# Patient Record
Sex: FEMALE | Race: WHITE | NOT HISPANIC OR LATINO | Employment: FULL TIME | URBAN - METROPOLITAN AREA
[De-identification: names, ages, dates, MRNs, and addresses within clinical notes are randomized per-mention and may not be internally consistent; named-entity substitution may affect disease eponyms.]

---

## 2022-05-24 ENCOUNTER — TELEPHONE (OUTPATIENT)
Dept: ORTHOPEDICS | Facility: CLINIC | Age: 47
End: 2022-05-24
Payer: COMMERCIAL

## 2022-05-24 NOTE — TELEPHONE ENCOUNTER
Contacted patient to give her a status update - rec'd images, have been sent to Dr. Martin for approval, and he's reviewed/approved her records.  Of note will need additional xrays on arrival to our location.  Discussed clearance work flow.  Has not spoken with PCP about f/u.  Ortho has declined f/u.  Pt will need labs, ekg, clearance.   She v/u of understanding of this and that surgery dates aren't discussed until full clearance is obtained. Pt has had recent dental exam.        I contacted Dr. Cheung's office to discuss program. Left v/m for someone to call me back. Will send request fax once I've spoken to someone.

## 2022-05-24 NOTE — TELEPHONE ENCOUNTER
Thank you   Please proceed    Of note, when she comes in for her surgery, she needs a new B knee standing PA flex view in addition to the normal templating xrays    Thank you  ===View-only below this line===  ----- Message -----  From: Josefina Yousif RN  Sent: 5/23/2022   9:37 PM CDT  To: Jose Martin III, MD, Kaleb Olmedo,   Ms. Azar was referred for L knee replacement.  Her BMI is 27.5 and has never smoked.  Her xrays are in.  PCP and ortho records are in media.  Please let me know of any requests.  Thank you,  Josefina

## 2022-05-25 ENCOUNTER — PATIENT MESSAGE (OUTPATIENT)
Dept: ORTHOPEDICS | Facility: CLINIC | Age: 47
End: 2022-05-25
Payer: COMMERCIAL

## 2022-05-31 ENCOUNTER — TELEPHONE (OUTPATIENT)
Dept: ORTHOPEDICS | Facility: CLINIC | Age: 47
End: 2022-05-31
Payer: COMMERCIAL

## 2022-05-31 NOTE — TELEPHONE ENCOUNTER
Called patient to do med review - patient at work; will be off rest of week.  Will call her tomorrow.

## 2022-06-01 ENCOUNTER — PATIENT MESSAGE (OUTPATIENT)
Dept: ORTHOPEDICS | Facility: CLINIC | Age: 47
End: 2022-06-01
Payer: COMMERCIAL

## 2022-06-01 ENCOUNTER — TELEPHONE (OUTPATIENT)
Dept: ORTHOPEDICS | Facility: CLINIC | Age: 47
End: 2022-06-01
Payer: COMMERCIAL

## 2022-06-01 RX ORDER — MELOXICAM 15 MG/1
15 TABLET ORAL DAILY
COMMUNITY
Start: 2022-01-14 | End: 2022-08-30 | Stop reason: HOSPADM

## 2022-06-01 RX ORDER — ESCITALOPRAM OXALATE 10 MG/1
10 TABLET ORAL DAILY
COMMUNITY
Start: 2022-03-14

## 2022-06-01 RX ORDER — TOPIRAMATE 100 MG/1
100 TABLET, FILM COATED ORAL NIGHTLY
COMMUNITY
Start: 2022-04-28

## 2022-06-01 RX ORDER — TRAMADOL HYDROCHLORIDE 50 MG/1
50 TABLET ORAL EVERY 6 HOURS PRN
COMMUNITY
Start: 2022-05-27 | End: 2022-08-30 | Stop reason: HOSPADM

## 2022-06-01 RX ORDER — AMLODIPINE BESYLATE AND BENAZEPRIL HYDROCHLORIDE 2.5; 1 MG/1; MG/1
1 CAPSULE ORAL DAILY
COMMUNITY
Start: 2022-05-24

## 2022-06-01 RX ORDER — METOPROLOL SUCCINATE 25 MG/1
25 TABLET, EXTENDED RELEASE ORAL NIGHTLY
COMMUNITY
Start: 2022-05-30

## 2022-06-01 RX ORDER — METHOCARBAMOL 750 MG/1
750 TABLET, FILM COATED ORAL EVERY 6 HOURS PRN
COMMUNITY
Start: 2022-05-27 | End: 2022-08-30 | Stop reason: HOSPADM

## 2022-06-01 NOTE — TELEPHONE ENCOUNTER
Planning to fly. Does not have any DME currently, we will order walker and bsc - has seat in shower. We did discuss in person vs virtual PT - will think about that and let me know.    Spoke to patient on: 6/1/2022    Surgeon: Tripp    Surgery: left Knee    Travel caregiver:     BMI:  27.5    Social Hx:     Updated in Epic               Patient works at [employer]            Plan to return home after surgery:  yes            Have support to help with care and appointments: yes     Allergies:    none    Medication list:    Updated in epic    Covid-19:   - vaccine: yes   - diagnosed with: yes; was not hospitalized, no O2 or inhalers, no residual effects     Self-description of overall health:  good    Health Hx:   Updated in [facility]    Surgical Hx:    Updated in epic    Can you take one flight of stairs: yes    RCRI:   - Coronary Artery Disease: no   - Congestive Heart Failure: no   - Cerebrovascular Disease: no   - Diabetes Mellitus on insulin: no      ROS:   - Fever/chills: no   - Infection: no   - Cough/Resp Issues:  no   - Bleeding (vaginal, rectal, vomiting, urination):  no   - GI/Fatty Liver/Acid Reflux/Vomiting: not currently; reports has hx of acid reflux and was on medication, but no longer having issues/taking meds   - UTI: no   - MRSA colonization: no   - Constipation:  no   - Recent steroid treatment: no   - Strokes or Passing out: no   - Blood Thinners or ASA: no   - Blood clot in heart/lung/extremity: no   - Stents: no   - Depression or Anxiety: yes - feels like this is well managed with current medication   - Problems with anesthesia:  no   - If female, having periods/pregnant: No  - Vision: wears glasses   - Dental exam recently: cleaning in Jan, sched for next cleaning in July - no issues currently    JAMES  Had sleep study done in past and was WDL   - S  snore loudly no   - T  tired during the day no   - O  stop breathing in sleep no   - P  BP/HTN yes   - B  BMI   27.5   - A  over 50 yes   - N   neck size   - G  male gender no    Family Hx:    Updated in Epic

## 2022-06-20 ENCOUNTER — DOCUMENTATION ONLY (OUTPATIENT)
Dept: INTERNAL MEDICINE | Facility: CLINIC | Age: 47
End: 2022-06-20
Payer: COMMERCIAL

## 2022-06-20 ENCOUNTER — TELEPHONE (OUTPATIENT)
Dept: ORTHOPEDICS | Facility: CLINIC | Age: 47
End: 2022-06-20
Payer: COMMERCIAL

## 2022-06-20 DIAGNOSIS — I10 HYPERTENSION, UNSPECIFIED TYPE: ICD-10-CM

## 2022-06-20 DIAGNOSIS — Z01.89 LABORATORY TEST: ICD-10-CM

## 2022-06-20 DIAGNOSIS — Z86.16 HISTORY OF COVID-19: ICD-10-CM

## 2022-06-20 PROBLEM — G43.909 MIGRAINE: Status: ACTIVE | Noted: 2022-06-20

## 2022-06-20 NOTE — PROGRESS NOTES
Chart reviewed in preparation for joint replacement     Health conditions of significance for the perioperative period     - HTN  - Migraine   - Depression

## 2022-06-20 NOTE — TELEPHONE ENCOUNTER
Attempted to reach patients with questions from Dr. Steiner - no answer; voicemail with call back number left for patient.

## 2022-06-21 ENCOUNTER — DOCUMENTATION ONLY (OUTPATIENT)
Dept: INTERNAL MEDICINE | Facility: CLINIC | Age: 47
End: 2022-06-21
Payer: COMMERCIAL

## 2022-06-21 ENCOUNTER — TELEPHONE (OUTPATIENT)
Dept: ORTHOPEDICS | Facility: CLINIC | Age: 47
End: 2022-06-21
Payer: COMMERCIAL

## 2022-06-21 DIAGNOSIS — Z86.16 HISTORY OF COVID-19: ICD-10-CM

## 2022-06-21 DIAGNOSIS — I10 HYPERTENSION, UNSPECIFIED TYPE: ICD-10-CM

## 2022-06-21 DIAGNOSIS — D64.9 ANEMIA, UNSPECIFIED TYPE: ICD-10-CM

## 2022-06-21 NOTE — PROGRESS NOTES
TB screenings were the standard employment screenings.  All negative.  Never symptomatic.           Arthroscopies   L knee in 2010 and 2020   R knee in 2018   No particular precipitating event.       She does still have her ovaries. Had an endometrial ablation as well.     Upon further discussion - she did not have the salpingectomy for reason such as ectopic or cancer, it was for contraception purposes.

## 2022-06-21 NOTE — TELEPHONE ENCOUNTER
Patient returned my call and we discussed Dr. Steiner's questions    TB screenings were the standard employment screenings.  All negative.  Never symptomatic.    Migraines are well controlled with topamax; states she gets migraine once a month maybe around the time of her cycle    HTN she feels is controlled.  Doesn't routinely check, but when she does she states it is in same range typically and mirrors what's at MD office.  120-130/70-80 - I did ask her to take a few over the rest of the week and check in Friday    No blood losses (bloody vomit, stool, etc)   States does take NSAID daily now (meloxicam) and after taking a few days in a row she will have issues with swelling and occasionally stomach upset, so she stops for a day or two and then resumes    Had Covid Halloween 2020    Arthroscopies   L knee in 2010 and 2020  R knee in 2018  No particular precipitating event.      She does still have her ovaries.  In our conversation she mentioned having had an endometrial ablation as well.    Upon further discussion - she did not have the salpingectomy for reason such as ectopic or cancer, it was for contraception purposes.

## 2022-06-22 ENCOUNTER — TELEPHONE (OUTPATIENT)
Dept: ORTHOPEDICS | Facility: CLINIC | Age: 47
End: 2022-06-22
Payer: COMMERCIAL

## 2022-06-22 NOTE — TELEPHONE ENCOUNTER
Attempted to reach patient about potential surgery dates - no answer; voicemail left with call back number

## 2022-06-23 ENCOUNTER — TELEPHONE (OUTPATIENT)
Dept: ORTHOPEDICS | Facility: CLINIC | Age: 47
End: 2022-06-23
Payer: COMMERCIAL

## 2022-06-24 ENCOUNTER — PATIENT MESSAGE (OUTPATIENT)
Dept: ORTHOPEDICS | Facility: CLINIC | Age: 47
End: 2022-06-24
Payer: COMMERCIAL

## 2022-06-30 ENCOUNTER — PATIENT MESSAGE (OUTPATIENT)
Dept: ORTHOPEDICS | Facility: CLINIC | Age: 47
End: 2022-06-30
Payer: COMMERCIAL

## 2022-06-30 ENCOUNTER — PATIENT MESSAGE (OUTPATIENT)
Dept: ADMINISTRATIVE | Facility: OTHER | Age: 47
End: 2022-06-30
Payer: COMMERCIAL

## 2022-06-30 DIAGNOSIS — M17.12 PRIMARY OSTEOARTHRITIS OF LEFT KNEE: Primary | ICD-10-CM

## 2022-07-18 ENCOUNTER — PATIENT MESSAGE (OUTPATIENT)
Dept: ORTHOPEDICS | Facility: CLINIC | Age: 47
End: 2022-07-18
Payer: COMMERCIAL

## 2022-07-18 ENCOUNTER — PATIENT MESSAGE (OUTPATIENT)
Dept: ADMINISTRATIVE | Facility: OTHER | Age: 47
End: 2022-07-18
Payer: COMMERCIAL

## 2022-08-01 ENCOUNTER — PATIENT MESSAGE (OUTPATIENT)
Dept: ADMINISTRATIVE | Facility: OTHER | Age: 47
End: 2022-08-01
Payer: COMMERCIAL

## 2022-08-05 ENCOUNTER — PATIENT MESSAGE (OUTPATIENT)
Dept: ADMINISTRATIVE | Facility: OTHER | Age: 47
End: 2022-08-05
Payer: COMMERCIAL

## 2022-08-08 ENCOUNTER — PATIENT MESSAGE (OUTPATIENT)
Dept: ADMINISTRATIVE | Facility: OTHER | Age: 47
End: 2022-08-08
Payer: COMMERCIAL

## 2022-08-09 ENCOUNTER — PATIENT MESSAGE (OUTPATIENT)
Dept: ORTHOPEDICS | Facility: CLINIC | Age: 47
End: 2022-08-09
Payer: COMMERCIAL

## 2022-08-24 ENCOUNTER — PATIENT MESSAGE (OUTPATIENT)
Dept: ADMINISTRATIVE | Facility: OTHER | Age: 47
End: 2022-08-24
Payer: COMMERCIAL

## 2022-08-29 ENCOUNTER — PATIENT MESSAGE (OUTPATIENT)
Dept: ADMINISTRATIVE | Facility: OTHER | Age: 47
End: 2022-08-29
Payer: COMMERCIAL

## 2022-08-30 ENCOUNTER — OFFICE VISIT (OUTPATIENT)
Dept: ORTHOPEDICS | Facility: CLINIC | Age: 47
End: 2022-08-30
Payer: COMMERCIAL

## 2022-08-30 ENCOUNTER — HOSPITAL ENCOUNTER (OUTPATIENT)
Dept: RADIOLOGY | Facility: HOSPITAL | Age: 47
Discharge: HOME | End: 2022-08-30
Attending: ORTHOPAEDIC SURGERY
Payer: COMMERCIAL

## 2022-08-30 ENCOUNTER — HOSPITAL ENCOUNTER (OUTPATIENT)
Dept: PREADMISSION TESTING | Facility: HOSPITAL | Age: 47
Discharge: HOME | End: 2022-08-30
Attending: ORTHOPAEDIC SURGERY
Payer: COMMERCIAL

## 2022-08-30 ENCOUNTER — ANESTHESIA EVENT (OUTPATIENT)
Dept: SURGERY | Facility: HOSPITAL | Age: 47
DRG: 470 | End: 2022-08-30
Payer: COMMERCIAL

## 2022-08-30 ENCOUNTER — OFFICE VISIT (OUTPATIENT)
Dept: INTERNAL MEDICINE | Facility: CLINIC | Age: 47
End: 2022-08-30
Payer: COMMERCIAL

## 2022-08-30 ENCOUNTER — TELEPHONE (OUTPATIENT)
Dept: ORTHOPEDICS | Facility: CLINIC | Age: 47
End: 2022-08-30

## 2022-08-30 ENCOUNTER — PATIENT MESSAGE (OUTPATIENT)
Dept: ORTHOPEDICS | Facility: CLINIC | Age: 47
End: 2022-08-30

## 2022-08-30 VITALS
BODY MASS INDEX: 27.16 KG/M2 | TEMPERATURE: 99 F | DIASTOLIC BLOOD PRESSURE: 79 MMHG | HEART RATE: 55 BPM | SYSTOLIC BLOOD PRESSURE: 130 MMHG | WEIGHT: 169 LBS | OXYGEN SATURATION: 100 % | HEIGHT: 66 IN | RESPIRATION RATE: 16 BRPM

## 2022-08-30 VITALS
HEIGHT: 66 IN | BODY MASS INDEX: 26.92 KG/M2 | WEIGHT: 167.5 LBS | HEIGHT: 66 IN | BODY MASS INDEX: 26.95 KG/M2 | WEIGHT: 167.69 LBS

## 2022-08-30 DIAGNOSIS — D64.9 ANEMIA, UNSPECIFIED TYPE: ICD-10-CM

## 2022-08-30 DIAGNOSIS — M17.12 PRIMARY OSTEOARTHRITIS OF LEFT KNEE: Primary | ICD-10-CM

## 2022-08-30 DIAGNOSIS — I10 PRIMARY HYPERTENSION: ICD-10-CM

## 2022-08-30 DIAGNOSIS — Z01.89 LABORATORY TEST: ICD-10-CM

## 2022-08-30 DIAGNOSIS — K21.9 GASTROESOPHAGEAL REFLUX DISEASE, UNSPECIFIED WHETHER ESOPHAGITIS PRESENT: ICD-10-CM

## 2022-08-30 DIAGNOSIS — F32.A DEPRESSION, UNSPECIFIED DEPRESSION TYPE: ICD-10-CM

## 2022-08-30 DIAGNOSIS — F11.90 OPIOID USE: ICD-10-CM

## 2022-08-30 DIAGNOSIS — M17.12 PRIMARY OSTEOARTHRITIS OF LEFT KNEE: ICD-10-CM

## 2022-08-30 DIAGNOSIS — Z01.818 PREOP EXAMINATION: Primary | ICD-10-CM

## 2022-08-30 DIAGNOSIS — G43.909 MIGRAINE WITHOUT STATUS MIGRAINOSUS, NOT INTRACTABLE, UNSPECIFIED MIGRAINE TYPE: ICD-10-CM

## 2022-08-30 DIAGNOSIS — Z86.16 HISTORY OF COVID-19: ICD-10-CM

## 2022-08-30 PROCEDURE — 99214 OFFICE O/P EST MOD 30 MIN: CPT | Mod: S$GLB,COE,, | Performed by: HOSPITALIST

## 2022-08-30 PROCEDURE — 99999 PR PBB SHADOW E&M-EST. PATIENT-LVL III: ICD-10-PCS | Mod: PBBFAC,COE,, | Performed by: NURSE PRACTITIONER

## 2022-08-30 PROCEDURE — 73560 X-RAY EXAM OF KNEE 1 OR 2: CPT | Mod: TC,50

## 2022-08-30 PROCEDURE — 73560 X-RAY EXAM OF KNEE 1 OR 2: CPT | Mod: 26,RT,COE, | Performed by: RADIOLOGY

## 2022-08-30 PROCEDURE — 99999 PR PBB SHADOW E&M-EST. PATIENT-LVL II: CPT | Mod: PBBFAC,COE,, | Performed by: HOSPITALIST

## 2022-08-30 PROCEDURE — 99999 PR PBB SHADOW E&M-EST. PATIENT-LVL III: CPT | Mod: PBBFAC,COE,, | Performed by: ORTHOPAEDIC SURGERY

## 2022-08-30 PROCEDURE — 73560 X-RAY EXAM OF KNEE 1 OR 2: CPT | Mod: 26,LT,COE, | Performed by: RADIOLOGY

## 2022-08-30 PROCEDURE — 99499 UNLISTED E&M SERVICE: CPT | Mod: S$GLB,COE,, | Performed by: NURSE PRACTITIONER

## 2022-08-30 PROCEDURE — 99204 OFFICE O/P NEW MOD 45 MIN: CPT | Mod: 57,S$GLB,COE, | Performed by: ORTHOPAEDIC SURGERY

## 2022-08-30 PROCEDURE — 73560 XR KNEE 1 OR 2 VIEW BILATERAL: ICD-10-PCS | Mod: 26,RT,COE, | Performed by: RADIOLOGY

## 2022-08-30 PROCEDURE — 99214 PR OFFICE/OUTPT VISIT, EST, LEVL IV, 30-39 MIN: ICD-10-PCS | Mod: S$GLB,COE,, | Performed by: HOSPITALIST

## 2022-08-30 PROCEDURE — 99999 PR PBB SHADOW E&M-EST. PATIENT-LVL III: CPT | Mod: PBBFAC,COE,, | Performed by: NURSE PRACTITIONER

## 2022-08-30 PROCEDURE — 99204 PR OFFICE/OUTPT VISIT, NEW, LEVL IV, 45-59 MIN: ICD-10-PCS | Mod: 57,S$GLB,COE, | Performed by: ORTHOPAEDIC SURGERY

## 2022-08-30 PROCEDURE — 73560 X-RAY EXAM OF KNEE 1 OR 2: CPT | Mod: TC,LT

## 2022-08-30 PROCEDURE — 99999 PR PBB SHADOW E&M-EST. PATIENT-LVL II: ICD-10-PCS | Mod: PBBFAC,COE,, | Performed by: HOSPITALIST

## 2022-08-30 PROCEDURE — 99499 NO LOS: ICD-10-PCS | Mod: S$GLB,COE,, | Performed by: NURSE PRACTITIONER

## 2022-08-30 PROCEDURE — 99999 PR PBB SHADOW E&M-EST. PATIENT-LVL III: ICD-10-PCS | Mod: PBBFAC,COE,, | Performed by: ORTHOPAEDIC SURGERY

## 2022-08-30 RX ORDER — SODIUM CHLORIDE 0.9 % (FLUSH) 0.9 %
10 SYRINGE (ML) INJECTION
Status: CANCELLED | OUTPATIENT
Start: 2022-08-30

## 2022-08-30 RX ORDER — POLYETHYLENE GLYCOL 3350 17 G/17G
17 POWDER, FOR SOLUTION ORAL DAILY
Status: CANCELLED | OUTPATIENT
Start: 2022-08-30

## 2022-08-30 RX ORDER — OXYCODONE HYDROCHLORIDE 5 MG/1
5 TABLET ORAL
Status: CANCELLED | OUTPATIENT
Start: 2022-08-30

## 2022-08-30 RX ORDER — DOCUSATE SODIUM 100 MG/1
100 CAPSULE, LIQUID FILLED ORAL 2 TIMES DAILY PRN
Qty: 60 CAPSULE | Refills: 0 | Status: SHIPPED | OUTPATIENT
Start: 2022-08-30

## 2022-08-30 RX ORDER — TALC
6 POWDER (GRAM) TOPICAL NIGHTLY PRN
Status: CANCELLED | OUTPATIENT
Start: 2022-08-30

## 2022-08-30 RX ORDER — PREGABALIN 75 MG/1
75 CAPSULE ORAL NIGHTLY
Status: CANCELLED | OUTPATIENT
Start: 2022-08-30

## 2022-08-30 RX ORDER — METHOCARBAMOL 750 MG/1
750 TABLET, FILM COATED ORAL 3 TIMES DAILY
Status: CANCELLED | OUTPATIENT
Start: 2022-08-30

## 2022-08-30 RX ORDER — FENTANYL CITRATE 50 UG/ML
25 INJECTION, SOLUTION INTRAMUSCULAR; INTRAVENOUS EVERY 5 MIN PRN
Status: CANCELLED | OUTPATIENT
Start: 2022-08-30

## 2022-08-30 RX ORDER — MIDAZOLAM HYDROCHLORIDE 1 MG/ML
4 INJECTION INTRAMUSCULAR; INTRAVENOUS
Status: CANCELLED | OUTPATIENT
Start: 2022-08-30 | End: 2022-08-31

## 2022-08-30 RX ORDER — DEXTROMETHORPHAN HYDROBROMIDE, GUAIFENESIN 5; 100 MG/5ML; MG/5ML
650 LIQUID ORAL
Qty: 120 TABLET | Refills: 0 | Status: SHIPPED | OUTPATIENT
Start: 2022-08-30

## 2022-08-30 RX ORDER — CELECOXIB 200 MG/1
200 CAPSULE ORAL DAILY
Status: CANCELLED | OUTPATIENT
Start: 2022-08-30

## 2022-08-30 RX ORDER — ACETAMINOPHEN 500 MG
1000 TABLET ORAL EVERY 6 HOURS
Status: CANCELLED | OUTPATIENT
Start: 2022-08-30

## 2022-08-30 RX ORDER — ASPIRIN 81 MG/1
81 TABLET ORAL 2 TIMES DAILY
Status: CANCELLED | OUTPATIENT
Start: 2022-08-30

## 2022-08-30 RX ORDER — CELECOXIB 200 MG/1
200 CAPSULE ORAL DAILY
Qty: 30 CAPSULE | Refills: 0 | Status: SHIPPED | OUTPATIENT
Start: 2022-08-30 | End: 2022-10-01

## 2022-08-30 RX ORDER — MUPIROCIN 20 MG/G
1 OINTMENT TOPICAL
Status: CANCELLED | OUTPATIENT
Start: 2022-08-30

## 2022-08-30 RX ORDER — METHOCARBAMOL 750 MG/1
750 TABLET, FILM COATED ORAL 3 TIMES DAILY PRN
Qty: 30 TABLET | Refills: 0 | Status: SHIPPED | OUTPATIENT
Start: 2022-08-30

## 2022-08-30 RX ORDER — CELECOXIB 200 MG/1
400 CAPSULE ORAL
Status: CANCELLED | OUTPATIENT
Start: 2022-08-30

## 2022-08-30 RX ORDER — PROCHLORPERAZINE EDISYLATE 5 MG/ML
5 INJECTION INTRAMUSCULAR; INTRAVENOUS EVERY 6 HOURS PRN
Status: CANCELLED | OUTPATIENT
Start: 2022-08-30

## 2022-08-30 RX ORDER — FENTANYL CITRATE 50 UG/ML
100 INJECTION, SOLUTION INTRAMUSCULAR; INTRAVENOUS
Status: CANCELLED | OUTPATIENT
Start: 2022-08-30 | End: 2022-08-31

## 2022-08-30 RX ORDER — CEFAZOLIN SODIUM 2 G/50ML
2 SOLUTION INTRAVENOUS
Status: CANCELLED | OUTPATIENT
Start: 2022-08-30 | End: 2022-08-31

## 2022-08-30 RX ORDER — CALC/MAG/B COMPLEX/D3/HERB 61
30 TABLET ORAL DAILY
COMMUNITY
Start: 2022-07-01

## 2022-08-30 RX ORDER — BISACODYL 10 MG
10 SUPPOSITORY, RECTAL RECTAL EVERY 12 HOURS PRN
Status: CANCELLED | OUTPATIENT
Start: 2022-08-30

## 2022-08-30 RX ORDER — MUPIROCIN 20 MG/G
1 OINTMENT TOPICAL 2 TIMES DAILY
Status: CANCELLED | OUTPATIENT
Start: 2022-08-30 | End: 2022-09-04

## 2022-08-30 RX ORDER — NALOXONE HCL 0.4 MG/ML
0.02 VIAL (ML) INJECTION
Status: CANCELLED | OUTPATIENT
Start: 2022-08-30

## 2022-08-30 RX ORDER — OXYCODONE HYDROCHLORIDE 5 MG/1
TABLET ORAL
Qty: 50 TABLET | Refills: 0 | Status: SHIPPED | OUTPATIENT
Start: 2022-08-30 | End: 2022-09-06 | Stop reason: SDUPTHER

## 2022-08-30 RX ORDER — ACETAMINOPHEN 500 MG
1000 TABLET ORAL
Status: CANCELLED | OUTPATIENT
Start: 2022-08-30

## 2022-08-30 RX ORDER — PREGABALIN 75 MG/1
75 CAPSULE ORAL
Status: CANCELLED | OUTPATIENT
Start: 2022-08-30

## 2022-08-30 RX ORDER — CEFAZOLIN SODIUM 2 G/50ML
2 SOLUTION INTRAVENOUS
Status: CANCELLED | OUTPATIENT
Start: 2022-08-30

## 2022-08-30 RX ORDER — MORPHINE SULFATE 2 MG/ML
2 INJECTION, SOLUTION INTRAMUSCULAR; INTRAVENOUS
Status: CANCELLED | OUTPATIENT
Start: 2022-08-30

## 2022-08-30 RX ORDER — SODIUM CHLORIDE 9 MG/ML
INJECTION, SOLUTION INTRAVENOUS
Status: CANCELLED | OUTPATIENT
Start: 2022-08-30

## 2022-08-30 RX ORDER — AMOXICILLIN 250 MG
1 CAPSULE ORAL 2 TIMES DAILY
Status: CANCELLED | OUTPATIENT
Start: 2022-08-30

## 2022-08-30 RX ORDER — FAMOTIDINE 20 MG/1
20 TABLET, FILM COATED ORAL 2 TIMES DAILY
Status: CANCELLED | OUTPATIENT
Start: 2022-08-30

## 2022-08-30 RX ORDER — ONDANSETRON 2 MG/ML
4 INJECTION INTRAMUSCULAR; INTRAVENOUS EVERY 8 HOURS PRN
Status: CANCELLED | OUTPATIENT
Start: 2022-08-30

## 2022-08-30 RX ORDER — ASPIRIN 81 MG/1
81 TABLET ORAL 2 TIMES DAILY
Qty: 60 TABLET | Refills: 0 | Status: SHIPPED | OUTPATIENT
Start: 2022-08-30 | End: 2022-10-01

## 2022-08-30 RX ORDER — SODIUM CHLORIDE 9 MG/ML
INJECTION, SOLUTION INTRAVENOUS CONTINUOUS
Status: CANCELLED | OUTPATIENT
Start: 2022-08-30 | End: 2022-08-31

## 2022-08-30 RX ORDER — OXYCODONE HYDROCHLORIDE 5 MG/1
10 TABLET ORAL
Status: CANCELLED | OUTPATIENT
Start: 2022-08-30

## 2022-08-30 RX ORDER — LIDOCAINE HYDROCHLORIDE 10 MG/ML
1 INJECTION, SOLUTION EPIDURAL; INFILTRATION; INTRACAUDAL; PERINEURAL
Status: CANCELLED | OUTPATIENT
Start: 2022-08-30

## 2022-08-30 NOTE — ASSESSMENT & PLAN NOTE
Migraines are well controlled with topamax; states she gets migraine once a month maybe around the time of her cycle        Had hormonal IUD in the past for contraception   1 st 2014   2 nd 2018   Had lot of pain    Had  heavy cycles after removal of IUD  She  had endometrial ablation done and had fallopian tubes removed in June 2019    LMP- June 2022  Not on contraception   Not pregnant per her  Sexually active      Her migraines have become less of a problem since she is going to the change   She has occasional hot flashes at nighttime which does not seem to be too troublesome for her

## 2022-08-30 NOTE — ASSESSMENT & PLAN NOTE
GERD Symptoms -acid taste to the throat    Does not sound Cardiac   Tips to control reflux discussed     GERD-  I suggest continuation of the Proton pump inhibitor in the perioperative period . I suggest aspiration precautions    Contributors     Caffeine   Fatty foods   Meloxicam     Lost 20 # since Feb 2022  By watching diet   Weight related conditions     Known to have     HTN  Hyperlipidemia   Acid reflux   Osteoarthritis    Not troubled with / Not known to have     Type 2 Diabetes    Sleep apnea    Fatty liver       Encouraged weight loss

## 2022-08-30 NOTE — ASSESSMENT & PLAN NOTE
Doing well   Lexapro is helping     I suggest monitoring the sodium as SIADH from Lexapro use and hypersecretion of ADH associated with surgery can reduce sodium in the perioperative period

## 2022-08-30 NOTE — HPI
History of present illness- I had the pleasure of meeting this pleasant 47 y.o. lady in the pre op clinic prior to her elective Orthopedic surgery. The patient is new to me . Teresa was accompanied by  Lei.    I have obtained the history by speaking to the patient and by reviewing the electronic health records.    Events leading up to surgery / History of presenting illness -      She has been troubled with moderate-severe  Left knee  pain for 1 year  .   Pain increases with activity and decreases with resting.  Taking Ultram once a day- 4-5 days  a week - Since march 2022  Meloxicam  - 4-5 days  a week - ( took it for since March 2022) -none since July 2022  Gets Stomach upset with Meloxicam   No ulcer history     Arthroscopic surgery for Meniscus injury    L knee in 2010 and 2020   R knee in 2018   No particular precipitating event.       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

## 2022-08-30 NOTE — PROGRESS NOTES
Subjective:     HPI:   Teresa Azar is a 47 y.o. female who presents for eval L knee OA, FITO L TKA    She complains of years of left knee pain global in nature predominantly lateral moderate to severe activity-related relieved with rest.  No groin anterior thigh radicular pain or paresthesias.    She had 1 right knee scope in 2018.  She has had 2 left knee scopes 1 in  and 1 in 2020.  The last knee scope documented grade 3 4 changes in her lateral compartment    She previously had a left total knee replacement scheduled locally in 2022    Medications: Tramadol daily (from her PCP) x2 years since L knee scope in , able to stop when got knee injections    Injections: 2022 left knee iaCSI, 90% relief for 8 weeks;    Physical Therapy: did PT post first knee scope, HEP with 2nd    Bracing: Yes,  HKB, the patient reported that she does not wear it because her leg would swell and the brace would be uncomfortable.     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 Stand 12.5 hrs at work daily    Social support: The patient stated that they live at home with their . The patient stated that their  would be able to help take care of them if they were to have surgery.        ROS:  The updated medical history is in the chart and has been reviewed. A review of systems is updated and there is no reported vision changes, ear/nose/mouth/throat complaints,  chest pain, shortness of breath, abdominal pain, urological complaints, fevers or chills, psychiatric complaints. Musculoskeletal and  neurologcial symptoms are as documented. All other systems are negative.      Objective:   Exam:  There were no vitals filed for this visit.  Body mass index is 27.48 kg/m².    Physical examination included assessment of the patient's general appearance with particular attention to development, nutrition, body habitus, attention to grooming, and any evidence of distress.  Constitutional: The patient is a well-developed, well-nourished patient in no acute distress.   Cardiovascular: Vascular examination included warmth and capillary refill as well inspection for edema and assessment of pedal pulses. Pulses are palpable and regular.  Musculoskeletal: Gait was assessed as to whether it was steady, non-antalgic, and/or required the use of an assist device. The patient was also asked to walk independently and get onto the examination table.  Skin: The skin was examined for any obvious rashes or lesions in the extremity.  Neurologic: Sensation is intact to light touch in the extremity. The patient has good coordination without hyperreflexia and is alert and oriented to person, place and time and has normal mood and affect.     All of the above were examined and found to be within normal limits except for the following pertinent clinical findings:    Slight limp slight antalgic gait favoring left knee.  0-125 degree knee range of motion 7° valgus alignment which does correct she is tender palpation mediolateral patellofemoral joint lines mild-to-moderate effusion knee stable anterior-posterior varus valgus stresses no flexion contracture or extensor lag      Imaging:    KNEE L ARTHRITIS     Indication:  Left knee pain  Exam Ordered: Radiographs of the left knee include a standing anteroposterior view, a standing posterioanterior view, a lateral view in full flexion, and a sunrise view  Details of Examination: Exam shows evidence of joint space narrowing, osteophyte formation, and subchondral sclerosis, all consistent with  degenerative arthritis of the knee.  No other significant findings are noted.  Impression:  Degenerative Arthritis, Left Knee    Grade 4 valgus left knee arthritis      Assessment:       ICD-10-CM ICD-9-CM   1. Primary osteoarthritis of left knee  M17.12 715.16      2 left knee scopes, 1 right knee scope  Long term tramadol use     Plan:       This patient has significant symptoms in their knee that are affecting their quality of life and daily activities.  They have tried non-operative treatment including analgesics, an exercise program, and activity modification, but the symptoms have persisted. I believe they make a good candidate for knee arthroplasty.     The risks and benefits of knee arthroplasty have been discussed with the patient which include, but are not limited to infections (including severe sequelae), potential component failure, fracture, DVT, pulmonary embolus, nerve palsy, poor range of motion, the possibility of bone grafting, persistent pain, wound healing complications, transfusions, medical complications and death.     We discussed surgical options including implant type and why I believe the implant selected is a good match for the patient's needs. The patient expressed understanding and agrees to proceed with the planned surgery.     Pre-operative planning will include the following:  A pre-surgical evaluation by will be arranged.  Pre-op orders will be placed.  We will make arrangements with the operating room for proper time and staffing.  We will make Social Service arrangements for the patient.    Implants:   Company: [employer]  System: Attune  primary tray    DVT prophylaxis: ASA 81mg BID x1 month    Dispo: outpatient PT    Admission status: Inpatient    Location: [place]        No orders of the defined types were placed in this encounter.            Past Medical History:   Diagnosis Date    Depression     Essential (primary) hypertension     GERD (gastroesophageal reflux disease)      Migraines     Primary osteoarthritis of left knee        Past Surgical History:   Procedure Laterality Date    ARTHROSCOPY OF BOTH KNEES      COLONOSCOPY  2019    ENDOMETRIAL ABLATION      SALPINGECTOMY Bilateral        Family History   Problem Relation Age of Onset    Cancer Mother 38        brain    Cancer Father         lung    Diabetes Sister     Heart attack Sister     Heart attacks under age 50 Brother        Social History     Socioeconomic History    Marital status:    Tobacco Use    Smoking status: Never    Smokeless tobacco: Never   Substance and Sexual Activity    Alcohol use: Yes     Comment: 1-2 drinks per month    Drug use: Never

## 2022-08-30 NOTE — ASSESSMENT & PLAN NOTE
She is on Lotrel in the morning time and Toprol-XL in the evening time   Home BP readings -120's/70's     Recent BP readings in the record-  Vitals - 1 value per visit 8/30/2022   SYSTOLIC 130   DIASTOLIC 79     Hypertension-  Blood pressure is acceptable . I suggest continuation of Toprol , Amlodipine - during the entire perioperative period. I suggest holding Benazepril- on the morning of the surgery and can continue that  post operatively under blood pressure, electrolyte and renal function monitoring as long as they are acceptable.I suggest addressing pain control as uncontrolled pain can increased blood pressure

## 2022-08-30 NOTE — ASSESSMENT & PLAN NOTE
She has occasional hemorrhoidal bleeding which is not heavy   She had that evaluated and is up-to-date with colonoscopy  She currently does not have any overt GI or  bleeding   She has been taking Meloxicam for her knee pain which is likely contributing to her anemia

## 2022-08-30 NOTE — ANESTHESIA PREPROCEDURE EVALUATION
[facility]  Anesthesia Pre-Operative Evaluation         Patient Name: Teresa Azar  YOB: 1975  MRN: [id number]    SUBJECTIVE:     Pre-operative evaluation for Procedure(s) (LRB):  ARTHROPLASTY, KNEE, TOTAL - Depuy-Attune - left - FITO (Left)     08/30/2022    Teresa Azar is a 47 y.o. female w/ a significant PMHx of HTN, Anemia, Migraines, Depression.    Patient now presents for the above procedure(s).      LDA: None documented.     Prev airway:  LMA 9/8/2020  Airway  Urgency: elective    General Information and Staff   Patient location during procedure: OR  Anesthesiologist: Tomi Tripathi MD  Performed: Anesthesiologist     Indications and Patient Condition  Indications for airway management: anesthesia  Sedation level: general induction  Preoxygenated: yes  Patient position: sniffing  Mask difficulty assessment: 1 - vent by mask    Final Airway Details  Final airway type: supraglottic airway    Successful airway: classic  Size 4    Number of attempts at approach: 1  Ventilation between attempts: 1 hand mask    Drips: None documented.      Patient Active Problem List   Diagnosis    HTN (hypertension)    Migraine    Laboratory test    History of COVID-19    Anemia       Review of patient's allergies indicates:  No Known Allergies    Current Inpatient Medications:      Current Outpatient Medications on File Prior to Encounter   Medication Sig Dispense Refill    amlodipine-benazepril 2.5-10 mg (LOTREL) 2.5-10 mg per capsule Take 1 capsule by mouth once daily.      EScitalopram oxalate (LEXAPRO) 10 MG tablet Take 10 mg by mouth once daily.      meloxicam (MOBIC) 15 MG tablet Take 15 mg by mouth once daily.      methocarbamoL (ROBAXIN) 750 MG Tab Take 750 mg by mouth every 6 (six) hours as needed.      metoprolol succinate (TOPROL-XL) 25 MG 24 hr tablet Take 25 mg by mouth once daily.      topiramate (TOPAMAX) 100 MG tablet Take 100 mg by mouth every evening.       "traMADoL (ULTRAM) 50 mg tablet Take 50 mg by mouth every 6 (six) hours as needed.       No current facility-administered medications on file prior to encounter.       Past Surgical History:   Procedure Laterality Date    ARTHROSCOPY OF BOTH KNEES      ENDOMETRIAL ABLATION      SALPINGECTOMY Bilateral        Social History     Socioeconomic History    Marital status:    Tobacco Use    Smoking status: Never    Smokeless tobacco: Never   Substance and Sexual Activity    Alcohol use: Yes     Comment: "rarely" - one drink per month    Drug use: Never       OBJECTIVE:     Vital Signs Range (Last 24H):  Temp:  [37 °C (98.6 °F)]   Pulse:  [55]   Resp:  [16]   BP: (130)/(79)   SpO2:  [100 %]       Significant Labs:  No results found for: WBC, HGB, HCT, PLT, CHOL, TRIG, HDL, LDLDIRECT, ALT, AST, NA, K, CL, CREATININE, BUN, CO2, TSH, PSA, INR, GLUF, HGBA1C, MICROALBUR    Diagnostic Studies: No relevant studies.    EKG:   No results found for this or any previous visit.    2D ECHO:  TTE:  No results found for this or any previous visit.    JANICE:  No results found for this or any previous visit.    ASSESSMENT/PLAN:                                                                                                                  08/30/2022  Teresa Azar is a 47 y.o., female.      Pre-op Assessment    I have reviewed the Patient Summary Reports.     I have reviewed the Nursing Notes. I have reviewed the NPO Status.   I have reviewed the Medications.     Review of Systems  Anesthesia Hx:  No problems with previous Anesthesia Denies Hx of Anesthetic complications  History of prior surgery of interest to airway management or planning: Denies Family Hx of Anesthesia complications.   Denies Personal Hx of Anesthesia complications.   Hematology/Oncology:         -- Anemia:   Cardiovascular:   Exercise tolerance: good Hypertension Denies CAD.    Denies CABG/stent.   Denies CHF. no hyperlipidemia    Pulmonary:   Denies " COPD.  Denies Asthma.  Denies Sleep Apnea.    Renal/:   Denies Chronic Renal Disease.     Hepatic/GI:   Denies GERD.    Musculoskeletal:   Arthritis     Neurological:   Denies CVA. Headaches Denies Seizures.    Endocrine:   Denies Diabetes.  Denies Obesity / BMI > 30  Psych:   Denies Psychiatric History. depression          Physical Exam  General: Well nourished, Cooperative, Alert and Oriented    Airway:  Mallampati: I   Mouth Opening: Normal  TM Distance: Normal  Tongue: Normal  Neck ROM: Normal ROM    Dental:  Intact        Anesthesia Plan  Type of Anesthesia, risks & benefits discussed:    Anesthesia Type: Regional, Gen Supraglottic Airway, Gen Natural Airway, MAC  Intra-op Monitoring Plan: Standard ASA Monitors  Post Op Pain Control Plan: multimodal analgesia and IV/PO Opioids PRN  Induction:  IV  Airway Plan: Direct and Video, Post-Induction  Informed Consent: Informed consent signed with the Patient and all parties understand the risks and agree with anesthesia plan.  All questions answered. Patient consented to blood products? Yes  ASA Score: 2  Day of Surgery Review of History & Physical: H&P Update referred to the surgeon/provider.    Ready For Surgery From Anesthesia Perspective.     .

## 2022-08-30 NOTE — ASSESSMENT & PLAN NOTE
Had Covid Halloween 2020   Had cough, loss of taste , smell   Generalized weakness, fatigue  Was not hospitalized, no O2 or inhalers, no residual effects   Breathing good   Had 3 doses of COVID vaccine

## 2022-08-30 NOTE — H&P (VIEW-ONLY)
CC:  Left knee pain    Teresa Azar is a 47 y.o. female with history of Left knee pain. Pain is worse with activity and weight bearing.  Patient has experienced interference of activities of daily living due to decreased range of motion and an increase in joint pain and swelling.  Patient has failed non-operative treatment including NSAIDs, corticosteroid injections, viscosupplement injections, and activity modification.  Teresa Azar currently ambulates independently.     Relevant medical conditions of significance in perioperative period:  HTN- on medication managed by pcp  Depression- on medication managed by ppc     Given documented medical comorbidities including those listed above and based off of CMS criteria patient would meet inpatient admission status guidelines. This case has been approved as inpatient.     Past Medical History:   Diagnosis Date    Depression     Essential (primary) hypertension     Migraines     Primary osteoarthritis of left knee        Past Surgical History:   Procedure Laterality Date    ARTHROSCOPY OF BOTH KNEES      ENDOMETRIAL ABLATION      SALPINGECTOMY Bilateral        Family History   Problem Relation Age of Onset    Cancer Mother 38        brain    Cancer Father         lung    Diabetes Sister     Heart attack Sister     Heart attacks under age 50 Brother        Review of patient's allergies indicates:  No Known Allergies      Current Outpatient Medications:     amlodipine-benazepril 2.5-10 mg (LOTREL) 2.5-10 mg per capsule, Take 1 capsule by mouth once daily., Disp: , Rfl:     EScitalopram oxalate (LEXAPRO) 10 MG tablet, Take 10 mg by mouth once daily., Disp: , Rfl:     metoprolol succinate (TOPROL-XL) 25 MG 24 hr tablet, Take 25 mg by mouth once daily., Disp: , Rfl:     topiramate (TOPAMAX) 100 MG tablet, Take 100 mg by mouth every evening., Disp: , Rfl:     traMADoL (ULTRAM) 50 mg tablet, Take 50 mg by mouth every 6 (six) hours as needed., Disp: , Rfl:     meloxicam  "(MOBIC) 15 MG tablet, Take 15 mg by mouth once daily., Disp: , Rfl:     methocarbamoL (ROBAXIN) 750 MG Tab, Take 750 mg by mouth every 6 (six) hours as needed., Disp: , Rfl:     Review of Systems:  Constitutional: no fever or chills  Eyes: no visual changes  ENT: no nasal congestion or sore throat  Respiratory: no cough or shortness of breath  Cardiovascular: no chest pain or palpitations  Gastrointestinal: no nausea or vomiting, tolerating diet  Genitourinary: no hematuria or dysuria  Integument/Breast: no rash or pruritis  Hematologic/Lymphatic: no easy bruising or lymphadenopathy  Musculoskeletal: positive for knee pain  Neurological: no seizures or tremors  Behavioral/Psych: no auditory or visual hallucinations  Endocrine: no heat or cold intolerance    PE:  Ht 5' 5.5" (1.664 m)   Wt 76 kg (167 lb 8 oz)   BMI 27.45 kg/m²   General: Pleasant, cooperative, NAD   Gait: antalgic  HEENT: NCAT, sclera nonicteric   Lungs: Respirations clear bilaterally; equal and unlabored.   CV: S1S2; 2+ bilateral upper and lower extremity pulses.   Skin: Intact throughout with no rashes, erythema, or lesions  Extremities: No LE edema,  no erythema or warmth of the skin in either lower extremity.    Left knee exam:  Knee Range of Motion:normal, pain with passive range of motion  Effusion:none  Condition of skin:intact  Location of tenderness:Medial joint line   Strength:normal  Stability: stable to testing    Hip Examination: painless PROM of hip     Radiographs: Radiographs reveal advanced degenerative changes including subchondral cyst formation, subchondral sclerosis, osteophyte formation, joint space narrowing.     Knee Alignment: normal    Diagnosis: Primary osteoarthritis Left knee    Plan: Left total knee arthroplasty    Due to the serious nature of total joint infection and the high prevalence of community acquired MRSA, vancomycin will be used perioperatively.            "

## 2022-08-30 NOTE — OUTPATIENT SUBJECTIVE & OBJECTIVE
Outpatient Subjective & Objective     Chief complaint-Preoperative evaluation, Perioperative Medical management, complication reduction plan     Active cardiac conditions- none    Revised cardiac risk index predictors- none    Functional capacity -Examples of physical activity  , house work and can take a flight of stairs holding on to the railing----- She can undertake all the above activities without  chest pain,chest tightness, Shortness of breath ,dizziness,lightheadedness making her exercise tolerance more,   than 4 Mets.       Review of Systems   Constitutional:  Negative for chills and fever.   HENT:          STOPBANG score  2/ 8        Elevated BP  Age over 50        Eyes:         No new visual changes   Respiratory:          No cough , phlegm    No Hemoptysis   Cardiovascular:         As noted   Gastrointestinal:           Bowel movements- Regular /daily   Endocrine:        Prednisone use > 20 mg daily for 3 weeks- None   Genitourinary:  Negative for dysuria.        No urinary hesitancy    Musculoskeletal:         As above      Skin:  Negative for rash.   Neurological:  Negative for syncope.        No unilateral weakness   Hematological:         Current use of Anticoagulants  None   Psychiatric/Behavioral:            Depression, - Controlled     No SI/HI   No vascular stenting     Not known to have heart disease , Diabetes Mellitus, Lung disease      Past Medical History Pertinent Negatives:   Diagnosis Date Noted    Deep vein thrombosis 08/30/2022    Pulmonary embolism 08/30/2022           No anesthesia, bleeding, cardiac problems , PONV with previous surgeries/procedures.  Medications and Allergies reviewed in [facility].     Had Cardiology evaluation long time ago   Not known to have heart disease   No longer has palpitations since 2012    Physical Exam    Vitals - 1 value per visit 8/30/2022   SYSTOLIC 130   DIASTOLIC 79   Pulse 55   Temp 98.6   Resp 16   SPO2 100   Weight (lb) 169   Weight (kg) 76.658    Height 65.5   BMI (Calculated) 27.7   No dizziness       Physical Exam  Constitutional- Vitals - Eyes- No conjunctival icterus,pupils  round  and reactive to light   ENT-Oral cavity-moist    , Hearing grossly normal   Neck- No thyromegaly ,Trachea -central, No jugular venous distension,   No Carotid Bruit   Cardiovascular -Heart Sounds- Normal   , No gallop rhythm   Respiratory - Normal Respiratory Effort,  no wheeze , and  no forced expiratory wheeze    Peripheral pitting pedal edema-- none , no calf pain   Gastrointestinal -Soft abdomen, No palpable masses, Non Tender,Liver,Spleen not palpable. No-- free fluid and shifting dullness  Musculoskeletal- No finger Clubbing. Strength grossly normal   Lymphatic-No Palpable cervical, axillary,Inguinal lymphadenopathy   Psychiatric - normal effect,Orientation  Rt Dorsalis pedis pulses-palpable    Lt Dorsalis pedis pulses- palpable   Rt Posterior tibial pulses -palpable   Left posterior tibial pulses -palpable   Miscellaneous -  Surgical scarboth knees   and  no renal bruit   I offered a gown and the presence of a chaperone during physical examination   She was comfortable to proceed with the exam without the the presence of a chaperone    Investigations  Labs  have been reviewed in epic.      Review of old records- Was done and information gathered regards to events leading to surgery and health conditions of significance in the perioperative period.    Outpatient Subjective & Objective

## 2022-08-30 NOTE — PROGRESS NOTES
[facility] [address]  Progress Note    Patient Name: Teresa Azar  MRN: [id number]  Date of Evaluation- 08/30/2022  PCP- Sarthak Cheung MD    Future cases for Teresa Azar [[id number]]       Case ID Status Date Time [place] Procedure Provider Location    [id number] [facility] 8/31/2022  8:00  ARTHROPLASTY, KNEE, TOTAL - Depuy-Attune - left - FITO Jose Martin III, MD [[id number]] [facility]            HPI:  History of present illness- I had the pleasure of meeting this pleasant 47 y.o. lady in the pre op clinic prior to her elective Orthopedic surgery. The patient is new to me . Teresa was accompanied by  Lei.    I have obtained the history by speaking to the patient and by reviewing the electronic health records.    Events leading up to surgery / History of presenting illness -      She has been troubled with moderate-severe  Left knee  pain for 1 year  .   Pain increases with activity and decreases with resting.  Taking Ultram once a day- 4-5 days  a week - Since march 2022  Meloxicam  - 4-5 days  a week - ( took it for since March 2022) -none since July 2022  Gets Stomach upset with Meloxicam   No ulcer history     Arthroscopic surgery for Meniscus injury    L knee in 2010 and 2020   R knee in 2018   No particular precipitating event.       Relevant health conditions of significance for the perioperative period/ History of presenting illness -    Subjectively describes health as good     Health conditions of significance for the perioperative period      - HTN  - Acid reflux   - Anemia  - Migraine   - History of COVID Oct 2020         She works as a pharmacist for [employer]   She lives with her  and 3 of her children   Her  had a heart transplant   They live in a 2 level house and their bedroom is downstairs     She has help available after surgery           Subjective/ Objective:       Chief complaint-Preoperative evaluation, Perioperative Medical management, complication reduction plan      Active cardiac conditions- none    Revised cardiac risk index predictors- none    Functional capacity -Examples of physical activity  , house work and can take a flight of stairs holding on to the railing----- She can undertake all the above activities without  chest pain,chest tightness, Shortness of breath ,dizziness,lightheadedness making her exercise tolerance more,   than 4 Mets.       Review of Systems   Constitutional:  Negative for chills and fever.   HENT:          STOPBANG score  2/ 8        Elevated BP  Age over 50        Eyes:         No new visual changes   Respiratory:          No cough , phlegm    No Hemoptysis   Cardiovascular:         As noted   Gastrointestinal:           Bowel movements- Regular /daily   Endocrine:        Prednisone use > 20 mg daily for 3 weeks- None   Genitourinary:  Negative for dysuria.        No urinary hesitancy    Musculoskeletal:         As above      Skin:  Negative for rash.   Neurological:  Negative for syncope.        No unilateral weakness   Hematological:         Current use of Anticoagulants  None   Psychiatric/Behavioral:            Depression, - Controlled     No SI/HI   No vascular stenting     Not known to have heart disease , Diabetes Mellitus, Lung disease      Past Medical History Pertinent Negatives:   Diagnosis Date Noted    Deep vein thrombosis 08/30/2022    Pulmonary embolism 08/30/2022           No anesthesia, bleeding, cardiac problems , PONV with previous surgeries/procedures.  Medications and Allergies reviewed in epic.     Had Cardiology evaluation long time ago   Not known to have heart disease   No longer has palpitations since 2012    Physical Exam    Vitals - 1 value per visit 8/30/2022   SYSTOLIC 130   DIASTOLIC 79   Pulse 55   Temp 98.6   Resp 16   SPO2 100   Weight (lb) 169   Weight (kg) 76.658   Height 65.5   BMI (Calculated) 27.7   No dizziness       Physical Exam  Constitutional- Vitals - Eyes- No conjunctival icterus,pupils  round  and  reactive to light   ENT-Oral cavity-moist    , Hearing grossly normal   Neck- No thyromegaly ,Trachea -central, No jugular venous distension,   No Carotid Bruit   Cardiovascular -Heart Sounds- Normal   , No gallop rhythm   Respiratory - Normal Respiratory Effort,  no wheeze , and  no forced expiratory wheeze    Peripheral pitting pedal edema-- none , no calf pain   Gastrointestinal -Soft abdomen, No palpable masses, Non Tender,Liver,Spleen not palpable. No-- free fluid and shifting dullness  Musculoskeletal- No finger Clubbing. Strength grossly normal   Lymphatic-No Palpable cervical, axillary,Inguinal lymphadenopathy   Psychiatric - normal effect,Orientation  Rt Dorsalis pedis pulses-palpable    Lt Dorsalis pedis pulses- palpable   Rt Posterior tibial pulses -palpable   Left posterior tibial pulses -palpable   Miscellaneous -  Surgical scarboth knees   and  no renal bruit   I offered a gown and the presence of a chaperone during physical examination   She was comfortable to proceed with the exam without the the presence of a chaperone    Investigations  Labs  have been reviewed in epic.      Review of old records- Was done and information gathered regards to events leading to surgery and health conditions of significance in the perioperative period.      Preoperative cardiac risk assessment-  The patient does not have any active cardiac conditions . Revised cardiac risk index predictors- 0---.Functional capacity is more than 4 Mets. She will be undergoing a Orthopedic procedure that carries a Moderate Risk risk     Risk of a major Cardiac event ( Defined as death, myocardial infarction, or cardiac arrest at 30 days after noncardiac surgery), based on RCRI score     -3.9%       No further cardiac work up is indicated prior to proceeding with the surgery          American Society of Anesthesiologists Physical status classification ( ASA ) class: 2     Postoperative pulmonary complication risk assessment:      MABEL  ( Canet) risk index- risk class -  Low       Assessment/Plan:     Migraine  Migraines are well controlled with topamax; states she gets migraine once a month maybe around the time of her cycle        Had hormonal IUD in the past for contraception   1 st 2014 2 nd 2018   Had lot of pain    Had  heavy cycles after removal of IUD  She  had endometrial ablation done and had fallopian tubes removed in June 2019    LMP- June 2022  Not on contraception   Not pregnant per her  Sexually active      Her migraines have become less of a problem since she is going to the change   She has occasional hot flashes at nighttime which does not seem to be too troublesome for her             HTN (hypertension)  She is on Lotrel in the morning time and Toprol-XL in the evening time   Home BP readings -120's/70's     Recent BP readings in the record-  Vitals - 1 value per visit 8/30/2022   SYSTOLIC 130   DIASTOLIC 79     Hypertension-  Blood pressure is acceptable . I suggest continuation of Toprol , Amlodipine - during the entire perioperative period. I suggest holding Benazepril- on the morning of the surgery and can continue that  post operatively under blood pressure, electrolyte and renal function monitoring as long as they are acceptable.I suggest addressing pain control as uncontrolled pain can increased blood pressure     History of COVID-19  Had Covid Halloween 2020   Had cough, loss of taste , smell   Generalized weakness, fatigue  Was not hospitalized, no O2 or inhalers, no residual effects   Breathing good   Had 3 doses of COVID vaccine         Laboratory test                        EKG May 2022  No acute appearing changes       Anemia  She has occasional hemorrhoidal bleeding which is not heavy   She had that evaluated and is up-to-date with colonoscopy  She currently does not have any overt GI or  bleeding   She has been taking Meloxicam for her knee pain which is likely contributing to her anemia       Acid reflux  GERD  Symptoms -acid taste to the throat    Does not sound Cardiac   Tips to control reflux discussed     GERD-  I suggest continuation of the Proton pump inhibitor in the perioperative period . I suggest aspiration precautions    Contributors     Caffeine   Fatty foods   Meloxicam     Lost 20 # since Feb 2022  By watching diet   Weight related conditions     Known to have     HTN  Hyperlipidemia   Acid reflux   Osteoarthritis    Not troubled with / Not known to have     Type 2 Diabetes    Sleep apnea    Fatty liver       Encouraged weight loss    Opioid use  Tramadol use  More frequency past 5 months   Chronic continuous opioid use- In view of the opioid use, the patient may have opioid tolerance . I suggest considering the possibility of opioid tolerance  in planning post operative pain control     Discussed possibly reducing the opioid use in preparation for surgery , so that it reduces the opioid tolerance which helps post op pain control     No opioid withdrawl    Depression  Doing well   Lexapro is helping     I suggest monitoring the sodium as SIADH from Lexapro use and hypersecretion of ADH associated with surgery can reduce sodium in the perioperative period        Preventive perioperative care    Thromboembolic prophylaxis:  Her risk factors for thrombosis include surgical procedure and age.I suggest  thromboembolic prophylaxis ( mechanical/pharmacological, weighing the risk benefits of pharmacological agent use considering roberto carlos procedural bleeding )  during the perioperative period.I suggested being active in the post operative period.   The patient is a candidate for extended DVT prophylaxis      Postoperative pulmonary complication prophylaxis-- I suggest incentive spirometry use, early ambulation, and end tidal carbon dioxide monitoring  , oral care , head end of bed elevation      Renal complication prophylaxis-Risk factors for renal complications include hypertension . I suggest keeping her well hydrated   in the perioperative period.     Surgical site Infection Prophylaxis-I  suggest appropriate antibiotic for Prophylaxis against Surgical site infections  No reported Staph infection  Skin antibacterial discussed       Delirium prophylaxis-Risk factors - opioid use - I suggest avoidance / minimizing the use of  Benzodiazepines ( unless the patient has been taking it on a regular basis ),Anticholinergic medication,Antihistamines ( like  Benadryl).I suggest minimizing the use of opioid medication and use of IV tylenol,if it is appropriate. I suggest using the lowest possible dose of opioids for the shortest duration possible in the perioperative period. I suggest to Keep shades/blinds open during the day, lights off and shades closed at night to encourage normal sleep/wake cycle.I encourage the presence of the family member with the patient at all times, if at all possible as mental status changes can be picked up early by the family members and they help with reorientation. I encouraged the presence of family to help with orientation in the perioperative period. Benadryl avoidance suggested      In view of Regional anesthesia the patient  is at risk of postoperative urinary retention.  I suggest avoidance / minimizing the of  Benzodiazepines,Anticholinergic medication,antihistamines ( Benadryl) , if possible in the perioperative period. I suggest using the minimum possible use of opioids for the minimum period of time in the perioperative period. Benadryl avoidance suggested      This visit was focused on Preoperative evaluation, Perioperative Medical management, complication reduction plans. I suggest that the patient follows up with primary care or relevant sub specialists for ongoing health care.    I appreciate the opportunity to be involved in this patients care. Please feel free to contact me if there were any questions about this consultation.    Patient is optimized    Patient/ care giver/ Family member was  instructed to call and update me about any changes to health,  medication, office visits ,testing out side of the [facility] , hospitalizations between now and surgery      Tamiko Steiner MD  Internal Medicine  [facility]   Cell Phone- [phone]        COVID screening     No fever   No cough   No SOB  No sore throat   No loss of taste or smell   No muscle aches   No nausea, vomiting , diarrhea -  --  \8/30/2022- 17 12    Reports having had an Rt Axillary lymph node enlargement after COVID vaccine   Spoke to her and her   Jan 2021   No infection   Resolved after 2 weeks   No problem with the 2 subsequent vaccines   No questions     Called to follow up , spoke to her to address any concerns with the up coming surgery or any questions on Medication instructions -  Doing well ,No changes to Medication, Health -

## 2022-08-30 NOTE — PROGRESS NOTES
Teresa Azar is a 47 y.o. year old here today for preoperative visit in preparation for a Left total knee arthroplasty to be performed by Dr. Martin  on 8/31/2022.  she was last seen and treated in the clinic on 8/30/2022. she will be medically optimized by the pre op center. There has been no significant change in medical status since last visit. No fever, chills, malaise, or unexplained weight change.      Allergies, Medications, past medical and surgical history reviewed.    Focused examination performed.    Patient saw surgeon in clinic today. All questions answered. Patient encouraged to call with questions. Contact information given.     Pre, [facility]to carlos, and post operative procedures and expectations discussed. Goals of successful surgery reviewed and include manageable pain levels, surgical site free of infection, medication management, and ambulation with PT/OT assistance. Healthy weight management discussed with patient and caregiver who were receptive to eduction of healthy diet and activity. No other necessary lifestyle changes identified. Educated patient about signs and symptoms of infection, medication management, anticoagulation therapy, risk of tobacco and alcohol use, and self-care to promote healing. Surgical guide given for future reference. Hibiclens given to patient with instructions. All questions were answered.     Teresa Azar verbalized an understanding to the education and goals. Patient has displayed readiness to engage in care and is ready to proceed with surgery.  Patient reports her  is able and ready to provide assistance at home after discharge.    Surgical and blood consents signed.    Teresa Azar will contact us if there are any questions, concerns, or changes in medical status prior to surgery.       Joint class: zoom    COVID-19 test date: vaccinated     Patient has discussed discharge planning with surgeon. Patient will be discharged to home following surgery.   patient  will be scheduled with [facility].    30 minutes of time was spent on patient education, review of records, templating, H&P, , appointment scheduling and optimizing patient for surgery.

## 2022-08-30 NOTE — ASSESSMENT & PLAN NOTE
Tramadol use  More frequency past 5 months   Chronic continuous opioid use- In view of the opioid use, the patient may have opioid tolerance . I suggest considering the possibility of opioid tolerance  in planning post operative pain control     Discussed possibly reducing the opioid use in preparation for surgery , so that it reduces the opioid tolerance which helps post op pain control     No opioid withdrawl

## 2022-08-30 NOTE — TELEPHONE ENCOUNTER
Met with patient during visits in our dept.  Reviewed further pre/post op questions she had, answered questions about [facility], and workflow for her stay. Ample time allowed for q&a and v/u to call for any needs.

## 2022-08-31 ENCOUNTER — HOSPITAL ENCOUNTER (INPATIENT)
Facility: HOSPITAL | Age: 47
LOS: 1 days | Discharge: HOME | DRG: 470 | End: 2022-09-01
Attending: ORTHOPAEDIC SURGERY | Admitting: ORTHOPAEDIC SURGERY
Payer: COMMERCIAL

## 2022-08-31 ENCOUNTER — ANESTHESIA (OUTPATIENT)
Dept: SURGERY | Facility: HOSPITAL | Age: 47
DRG: 470 | End: 2022-08-31
Payer: COMMERCIAL

## 2022-08-31 ENCOUNTER — TELEPHONE (OUTPATIENT)
Dept: PHARMACY | Facility: CLINIC | Age: 47
End: 2022-08-31
Payer: COMMERCIAL

## 2022-08-31 DIAGNOSIS — M17.12 PRIMARY OSTEOARTHRITIS OF LEFT KNEE: ICD-10-CM

## 2022-08-31 PROCEDURE — 25000003 PHARM REV CODE 250: Performed by: SURGERY

## 2022-08-31 PROCEDURE — 97116 GAIT TRAINING THERAPY: CPT

## 2022-08-31 PROCEDURE — 97535 SELF CARE MNGMENT TRAINING: CPT

## 2022-08-31 PROCEDURE — 97161 PT EVAL LOW COMPLEX 20 MIN: CPT

## 2022-08-31 PROCEDURE — 71000033 HC RECOVERY, INTIAL HOUR: Performed by: ORTHOPAEDIC SURGERY

## 2022-08-31 PROCEDURE — 37000008 HC ANESTHESIA 1ST 15 MINUTES: Performed by: ORTHOPAEDIC SURGERY

## 2022-08-31 PROCEDURE — 97165 OT EVAL LOW COMPLEX 30 MIN: CPT

## 2022-08-31 PROCEDURE — 71000039 HC RECOVERY, EACH ADD'L HOUR: Performed by: ORTHOPAEDIC SURGERY

## 2022-08-31 PROCEDURE — D9220A PRA ANESTHESIA: ICD-10-PCS | Mod: CRNA,COE,, | Performed by: NURSE ANESTHETIST, CERTIFIED REGISTERED

## 2022-08-31 PROCEDURE — 11000001 HC ACUTE MED/SURG PRIVATE ROOM

## 2022-08-31 PROCEDURE — 63600175 PHARM REV CODE 636 W HCPCS: Performed by: ORTHOPAEDIC SURGERY

## 2022-08-31 PROCEDURE — C1713 ANCHOR/SCREW BN/BN,TIS/BN: HCPCS | Performed by: ORTHOPAEDIC SURGERY

## 2022-08-31 PROCEDURE — 63600175 PHARM REV CODE 636 W HCPCS: Performed by: SURGERY

## 2022-08-31 PROCEDURE — 27201423 OPTIME MED/SURG SUP & DEVICES STERILE SUPPLY: Performed by: ORTHOPAEDIC SURGERY

## 2022-08-31 PROCEDURE — 27447 TOTAL KNEE ARTHROPLASTY: CPT | Mod: LT,COE,, | Performed by: ORTHOPAEDIC SURGERY

## 2022-08-31 PROCEDURE — 27100025 HC TUBING, SET FLUID WARMER: Performed by: SURGERY

## 2022-08-31 PROCEDURE — 25000003 PHARM REV CODE 250: Performed by: NURSE PRACTITIONER

## 2022-08-31 PROCEDURE — 36000710: Performed by: ORTHOPAEDIC SURGERY

## 2022-08-31 PROCEDURE — 25000003 PHARM REV CODE 250: Performed by: NURSE ANESTHETIST, CERTIFIED REGISTERED

## 2022-08-31 PROCEDURE — C1776 JOINT DEVICE (IMPLANTABLE): HCPCS | Performed by: ORTHOPAEDIC SURGERY

## 2022-08-31 PROCEDURE — D9220A PRA ANESTHESIA: Mod: ANES,COE,, | Performed by: SURGERY

## 2022-08-31 PROCEDURE — 99900035 HC TECH TIME PER 15 MIN (STAT)

## 2022-08-31 PROCEDURE — D9220A PRA ANESTHESIA: Mod: CRNA,COE,, | Performed by: NURSE ANESTHETIST, CERTIFIED REGISTERED

## 2022-08-31 PROCEDURE — 25000003 PHARM REV CODE 250: Performed by: ORTHOPAEDIC SURGERY

## 2022-08-31 PROCEDURE — 27447 PR TOTAL KNEE ARTHROPLASTY: ICD-10-PCS | Mod: LT,COE,, | Performed by: ORTHOPAEDIC SURGERY

## 2022-08-31 PROCEDURE — 37000009 HC ANESTHESIA EA ADD 15 MINS: Performed by: ORTHOPAEDIC SURGERY

## 2022-08-31 PROCEDURE — D9220A PRA ANESTHESIA: ICD-10-PCS | Mod: ANES,COE,, | Performed by: SURGERY

## 2022-08-31 PROCEDURE — 63600175 PHARM REV CODE 636 W HCPCS: Performed by: NURSE PRACTITIONER

## 2022-08-31 PROCEDURE — 36000711: Performed by: ORTHOPAEDIC SURGERY

## 2022-08-31 PROCEDURE — 94761 N-INVAS EAR/PLS OXIMETRY MLT: CPT

## 2022-08-31 DEVICE — BASEPLATE ATTUNE TIB FIX CEM 4: Type: IMPLANTABLE DEVICE | Site: KNEE | Status: FUNCTIONAL

## 2022-08-31 DEVICE — PATELLA ATTUNE MED 32MM: Type: IMPLANTABLE DEVICE | Site: KNEE | Status: FUNCTIONAL

## 2022-08-31 DEVICE — IMPLANTABLE DEVICE: Type: IMPLANTABLE DEVICE | Site: KNEE | Status: FUNCTIONAL

## 2022-08-31 DEVICE — CEMENT BONE WHOLE BATCH: Type: IMPLANTABLE DEVICE | Site: KNEE | Status: FUNCTIONAL

## 2022-08-31 RX ORDER — METOPROLOL SUCCINATE 25 MG/1
25 TABLET, EXTENDED RELEASE ORAL NIGHTLY
Status: DISCONTINUED | OUTPATIENT
Start: 2022-08-31 | End: 2022-09-01 | Stop reason: HOSPADM

## 2022-08-31 RX ORDER — NALOXONE HCL 0.4 MG/ML
0.02 VIAL (ML) INJECTION
Status: DISCONTINUED | OUTPATIENT
Start: 2022-08-31 | End: 2022-09-01 | Stop reason: HOSPADM

## 2022-08-31 RX ORDER — MIDAZOLAM HYDROCHLORIDE 1 MG/ML
INJECTION, SOLUTION INTRAMUSCULAR; INTRAVENOUS
Status: DISCONTINUED | OUTPATIENT
Start: 2022-08-31 | End: 2022-08-31

## 2022-08-31 RX ORDER — OXYCODONE HYDROCHLORIDE 5 MG/1
10 TABLET ORAL EVERY 4 HOURS PRN
Status: DISCONTINUED | OUTPATIENT
Start: 2022-08-31 | End: 2022-08-31 | Stop reason: HOSPADM

## 2022-08-31 RX ORDER — VANCOMYCIN HYDROCHLORIDE 1 G/20ML
INJECTION, POWDER, LYOPHILIZED, FOR SOLUTION INTRAVENOUS
Status: DISCONTINUED | OUTPATIENT
Start: 2022-08-31 | End: 2022-08-31 | Stop reason: HOSPADM

## 2022-08-31 RX ORDER — PREGABALIN 75 MG/1
75 CAPSULE ORAL NIGHTLY
Status: DISCONTINUED | OUTPATIENT
Start: 2022-08-31 | End: 2022-09-01 | Stop reason: HOSPADM

## 2022-08-31 RX ORDER — TRANEXAMIC ACID 100 MG/ML
1000 INJECTION, SOLUTION INTRAVENOUS
Status: DISCONTINUED | OUTPATIENT
Start: 2022-08-31 | End: 2022-08-31 | Stop reason: HOSPADM

## 2022-08-31 RX ORDER — ESCITALOPRAM OXALATE 10 MG/1
10 TABLET ORAL NIGHTLY
Status: DISCONTINUED | OUTPATIENT
Start: 2022-08-31 | End: 2022-09-01 | Stop reason: HOSPADM

## 2022-08-31 RX ORDER — ACETAMINOPHEN 500 MG
1000 TABLET ORAL EVERY 6 HOURS
Status: DISCONTINUED | OUTPATIENT
Start: 2022-08-31 | End: 2022-09-01 | Stop reason: HOSPADM

## 2022-08-31 RX ORDER — ESCITALOPRAM OXALATE 10 MG/1
10 TABLET ORAL DAILY
Status: DISCONTINUED | OUTPATIENT
Start: 2022-08-31 | End: 2022-08-31

## 2022-08-31 RX ORDER — SODIUM CHLORIDE 0.9 % (FLUSH) 0.9 %
10 SYRINGE (ML) INJECTION
Status: DISCONTINUED | OUTPATIENT
Start: 2022-08-31 | End: 2022-08-31 | Stop reason: HOSPADM

## 2022-08-31 RX ORDER — FENTANYL CITRATE 50 UG/ML
25 INJECTION, SOLUTION INTRAMUSCULAR; INTRAVENOUS EVERY 5 MIN PRN
Status: DISCONTINUED | OUTPATIENT
Start: 2022-08-31 | End: 2022-08-31 | Stop reason: HOSPADM

## 2022-08-31 RX ORDER — HYDROMORPHONE HYDROCHLORIDE 1 MG/ML
0.2 INJECTION, SOLUTION INTRAMUSCULAR; INTRAVENOUS; SUBCUTANEOUS EVERY 5 MIN PRN
Status: DISCONTINUED | OUTPATIENT
Start: 2022-08-31 | End: 2022-08-31 | Stop reason: HOSPADM

## 2022-08-31 RX ORDER — AMLODIPINE BESYLATE AND BENAZEPRIL HYDROCHLORIDE 2.5; 1 MG/1; MG/1
1 CAPSULE ORAL DAILY
Status: DISCONTINUED | OUTPATIENT
Start: 2022-09-01 | End: 2022-09-01 | Stop reason: HOSPADM

## 2022-08-31 RX ORDER — AMOXICILLIN 250 MG
1 CAPSULE ORAL 2 TIMES DAILY
Status: DISCONTINUED | OUTPATIENT
Start: 2022-08-31 | End: 2022-09-01 | Stop reason: HOSPADM

## 2022-08-31 RX ORDER — METHOCARBAMOL 750 MG/1
750 TABLET, FILM COATED ORAL ONCE
Status: COMPLETED | OUTPATIENT
Start: 2022-08-31 | End: 2022-08-31

## 2022-08-31 RX ORDER — BISACODYL 10 MG
10 SUPPOSITORY, RECTAL RECTAL EVERY 12 HOURS PRN
Status: DISCONTINUED | OUTPATIENT
Start: 2022-08-31 | End: 2022-09-01 | Stop reason: HOSPADM

## 2022-08-31 RX ORDER — ROPIVACAINE/EPI/CLONIDINE/KET 2.46-0.005
SYRINGE (ML) INJECTION
Status: DISCONTINUED | OUTPATIENT
Start: 2022-08-31 | End: 2022-08-31 | Stop reason: HOSPADM

## 2022-08-31 RX ORDER — MUPIROCIN 20 MG/G
1 OINTMENT TOPICAL
Status: COMPLETED | OUTPATIENT
Start: 2022-08-31 | End: 2022-08-31

## 2022-08-31 RX ORDER — PHENYLEPHRINE HCL IN 0.9% NACL 1 MG/10 ML
SYRINGE (ML) INTRAVENOUS
Status: DISCONTINUED | OUTPATIENT
Start: 2022-08-31 | End: 2022-08-31

## 2022-08-31 RX ORDER — OXYCODONE HYDROCHLORIDE 5 MG/1
5 TABLET ORAL
Status: DISCONTINUED | OUTPATIENT
Start: 2022-08-31 | End: 2022-09-01 | Stop reason: HOSPADM

## 2022-08-31 RX ORDER — FAMOTIDINE 10 MG/ML
INJECTION INTRAVENOUS
Status: DISCONTINUED | OUTPATIENT
Start: 2022-08-31 | End: 2022-08-31

## 2022-08-31 RX ORDER — ONDANSETRON 2 MG/ML
INJECTION INTRAMUSCULAR; INTRAVENOUS
Status: DISCONTINUED | OUTPATIENT
Start: 2022-08-31 | End: 2022-08-31

## 2022-08-31 RX ORDER — PROPOFOL 10 MG/ML
VIAL (ML) INTRAVENOUS CONTINUOUS PRN
Status: DISCONTINUED | OUTPATIENT
Start: 2022-08-31 | End: 2022-08-31

## 2022-08-31 RX ORDER — MORPHINE SULFATE 2 MG/ML
2 INJECTION, SOLUTION INTRAMUSCULAR; INTRAVENOUS
Status: DISCONTINUED | OUTPATIENT
Start: 2022-08-31 | End: 2022-09-01 | Stop reason: HOSPADM

## 2022-08-31 RX ORDER — DEXMEDETOMIDINE HYDROCHLORIDE 100 UG/ML
INJECTION, SOLUTION INTRAVENOUS
Status: DISCONTINUED | OUTPATIENT
Start: 2022-08-31 | End: 2022-08-31

## 2022-08-31 RX ORDER — AMLODIPINE BESYLATE AND BENAZEPRIL HYDROCHLORIDE 2.5; 1 MG/1; MG/1
1 CAPSULE ORAL DAILY
Status: DISCONTINUED | OUTPATIENT
Start: 2022-08-31 | End: 2022-08-31

## 2022-08-31 RX ORDER — CEFAZOLIN SODIUM 1 G/3ML
2 INJECTION, POWDER, FOR SOLUTION INTRAMUSCULAR; INTRAVENOUS
Status: DISCONTINUED | OUTPATIENT
Start: 2022-08-31 | End: 2022-08-31 | Stop reason: HOSPADM

## 2022-08-31 RX ORDER — OXYCODONE HYDROCHLORIDE 10 MG/1
10 TABLET ORAL
Status: DISCONTINUED | OUTPATIENT
Start: 2022-08-31 | End: 2022-09-01 | Stop reason: HOSPADM

## 2022-08-31 RX ORDER — PANTOPRAZOLE SODIUM 40 MG/1
40 TABLET, DELAYED RELEASE ORAL DAILY
Status: DISCONTINUED | OUTPATIENT
Start: 2022-08-31 | End: 2022-09-01 | Stop reason: HOSPADM

## 2022-08-31 RX ORDER — TALC
6 POWDER (GRAM) TOPICAL NIGHTLY PRN
Status: DISCONTINUED | OUTPATIENT
Start: 2022-08-31 | End: 2022-08-31 | Stop reason: HOSPADM

## 2022-08-31 RX ORDER — SODIUM CHLORIDE 9 MG/ML
INJECTION, SOLUTION INTRAVENOUS CONTINUOUS
Status: ACTIVE | OUTPATIENT
Start: 2022-08-31 | End: 2022-09-01

## 2022-08-31 RX ORDER — SODIUM CHLORIDE 9 MG/ML
INJECTION, SOLUTION INTRAVENOUS
Status: COMPLETED | OUTPATIENT
Start: 2022-08-31 | End: 2022-08-31

## 2022-08-31 RX ORDER — FAMOTIDINE 20 MG/1
20 TABLET, FILM COATED ORAL 2 TIMES DAILY
Status: DISCONTINUED | OUTPATIENT
Start: 2022-08-31 | End: 2022-09-01 | Stop reason: HOSPADM

## 2022-08-31 RX ORDER — MUPIROCIN 20 MG/G
1 OINTMENT TOPICAL 2 TIMES DAILY
Status: DISCONTINUED | OUTPATIENT
Start: 2022-08-31 | End: 2022-09-01 | Stop reason: HOSPADM

## 2022-08-31 RX ORDER — LIDOCAINE HYDROCHLORIDE 20 MG/ML
INJECTION, SOLUTION EPIDURAL; INFILTRATION; INTRACAUDAL; PERINEURAL
Status: DISCONTINUED | OUTPATIENT
Start: 2022-08-31 | End: 2022-08-31

## 2022-08-31 RX ORDER — CEFAZOLIN SODIUM 1 G/3ML
INJECTION, POWDER, FOR SOLUTION INTRAMUSCULAR; INTRAVENOUS
Status: DISCONTINUED | OUTPATIENT
Start: 2022-08-31 | End: 2022-08-31

## 2022-08-31 RX ORDER — DEXAMETHASONE SODIUM PHOSPHATE 4 MG/ML
INJECTION, SOLUTION INTRA-ARTICULAR; INTRALESIONAL; INTRAMUSCULAR; INTRAVENOUS; SOFT TISSUE
Status: DISCONTINUED | OUTPATIENT
Start: 2022-08-31 | End: 2022-08-31

## 2022-08-31 RX ORDER — TOPIRAMATE 25 MG/1
100 TABLET ORAL NIGHTLY
Status: DISCONTINUED | OUTPATIENT
Start: 2022-08-31 | End: 2022-09-01 | Stop reason: HOSPADM

## 2022-08-31 RX ORDER — LIDOCAINE HYDROCHLORIDE 10 MG/ML
1 INJECTION, SOLUTION EPIDURAL; INFILTRATION; INTRACAUDAL; PERINEURAL
Status: DISCONTINUED | OUTPATIENT
Start: 2022-08-31 | End: 2022-08-31 | Stop reason: HOSPADM

## 2022-08-31 RX ORDER — METHOCARBAMOL 750 MG/1
750 TABLET, FILM COATED ORAL 3 TIMES DAILY
Status: DISCONTINUED | OUTPATIENT
Start: 2022-08-31 | End: 2022-09-01 | Stop reason: HOSPADM

## 2022-08-31 RX ORDER — CEFAZOLIN SODIUM 2 G/50ML
2 SOLUTION INTRAVENOUS
Status: DISCONTINUED | OUTPATIENT
Start: 2022-08-31 | End: 2022-09-01 | Stop reason: HOSPADM

## 2022-08-31 RX ORDER — CEFAZOLIN SODIUM/D5W 2 G/100 ML
2 PLASTIC BAG, INJECTION (ML) INTRAVENOUS
Status: DISCONTINUED | OUTPATIENT
Start: 2022-08-31 | End: 2022-08-31

## 2022-08-31 RX ORDER — VANCOMYCIN HCL IN 5 % DEXTROSE 1G/250ML
1000 PLASTIC BAG, INJECTION (ML) INTRAVENOUS
Status: COMPLETED | OUTPATIENT
Start: 2022-08-31 | End: 2022-08-31

## 2022-08-31 RX ORDER — KETAMINE HCL IN 0.9 % NACL 50 MG/5 ML
SYRINGE (ML) INTRAVENOUS
Status: DISCONTINUED | OUTPATIENT
Start: 2022-08-31 | End: 2022-08-31

## 2022-08-31 RX ORDER — CELECOXIB 200 MG/1
400 CAPSULE ORAL
Status: COMPLETED | OUTPATIENT
Start: 2022-08-31 | End: 2022-08-31

## 2022-08-31 RX ORDER — ACETAMINOPHEN 500 MG
1000 TABLET ORAL
Status: COMPLETED | OUTPATIENT
Start: 2022-08-31 | End: 2022-08-31

## 2022-08-31 RX ORDER — ONDANSETRON 2 MG/ML
4 INJECTION INTRAMUSCULAR; INTRAVENOUS EVERY 8 HOURS PRN
Status: DISCONTINUED | OUTPATIENT
Start: 2022-08-31 | End: 2022-09-01 | Stop reason: HOSPADM

## 2022-08-31 RX ORDER — POLYETHYLENE GLYCOL 3350 17 G/17G
17 POWDER, FOR SOLUTION ORAL DAILY
Status: DISCONTINUED | OUTPATIENT
Start: 2022-08-31 | End: 2022-09-01 | Stop reason: HOSPADM

## 2022-08-31 RX ORDER — FENTANYL CITRATE 50 UG/ML
100 INJECTION, SOLUTION INTRAMUSCULAR; INTRAVENOUS
Status: DISCONTINUED | OUTPATIENT
Start: 2022-08-31 | End: 2022-08-31 | Stop reason: HOSPADM

## 2022-08-31 RX ORDER — ASPIRIN 81 MG/1
81 TABLET ORAL 2 TIMES DAILY
Status: DISCONTINUED | OUTPATIENT
Start: 2022-08-31 | End: 2022-09-01 | Stop reason: HOSPADM

## 2022-08-31 RX ORDER — HALOPERIDOL 5 MG/ML
0.5 INJECTION INTRAMUSCULAR EVERY 10 MIN PRN
Status: DISCONTINUED | OUTPATIENT
Start: 2022-08-31 | End: 2022-08-31 | Stop reason: HOSPADM

## 2022-08-31 RX ORDER — MIDAZOLAM HYDROCHLORIDE 1 MG/ML
4 INJECTION INTRAMUSCULAR; INTRAVENOUS
Status: DISCONTINUED | OUTPATIENT
Start: 2022-08-31 | End: 2022-08-31 | Stop reason: HOSPADM

## 2022-08-31 RX ORDER — PROCHLORPERAZINE EDISYLATE 5 MG/ML
5 INJECTION INTRAMUSCULAR; INTRAVENOUS EVERY 6 HOURS PRN
Status: DISCONTINUED | OUTPATIENT
Start: 2022-08-31 | End: 2022-09-01 | Stop reason: HOSPADM

## 2022-08-31 RX ORDER — PREGABALIN 75 MG/1
75 CAPSULE ORAL
Status: COMPLETED | OUTPATIENT
Start: 2022-08-31 | End: 2022-08-31

## 2022-08-31 RX ORDER — TRANEXAMIC ACID 100 MG/ML
1000 INJECTION, SOLUTION INTRAVENOUS
Status: COMPLETED | OUTPATIENT
Start: 2022-08-31 | End: 2022-08-31

## 2022-08-31 RX ORDER — CARBOXYMETHYLCELLULOSE SODIUM 5 MG/ML
SOLUTION/ DROPS OPHTHALMIC
Status: DISCONTINUED | OUTPATIENT
Start: 2022-08-31 | End: 2022-08-31

## 2022-08-31 RX ADMIN — ASPIRIN 81 MG: 81 TABLET, COATED ORAL at 08:08

## 2022-08-31 RX ADMIN — SODIUM CHLORIDE: 0.9 INJECTION, SOLUTION INTRAVENOUS at 06:08

## 2022-08-31 RX ADMIN — CEFAZOLIN SODIUM 2 G: 2 SOLUTION INTRAVENOUS at 05:08

## 2022-08-31 RX ADMIN — OXYCODONE 5 MG: 5 TABLET ORAL at 03:08

## 2022-08-31 RX ADMIN — DEXMEDETOMIDINE HYDROCHLORIDE 8 MCG: 100 INJECTION, SOLUTION INTRAVENOUS at 08:08

## 2022-08-31 RX ADMIN — ACETAMINOPHEN 1000 MG: 500 TABLET ORAL at 01:08

## 2022-08-31 RX ADMIN — FAMOTIDINE 20 MG: 20 TABLET ORAL at 08:08

## 2022-08-31 RX ADMIN — METHOCARBAMOL 750 MG: 750 TABLET ORAL at 11:08

## 2022-08-31 RX ADMIN — ONDANSETRON 4 MG: 2 INJECTION INTRAMUSCULAR; INTRAVENOUS at 08:08

## 2022-08-31 RX ADMIN — OXYCODONE 5 MG: 5 TABLET ORAL at 07:08

## 2022-08-31 RX ADMIN — ESCITALOPRAM OXALATE 10 MG: 10 TABLET ORAL at 08:08

## 2022-08-31 RX ADMIN — MIDAZOLAM 2 MG: 1 INJECTION INTRAMUSCULAR; INTRAVENOUS at 08:08

## 2022-08-31 RX ADMIN — CELECOXIB 400 MG: 200 CAPSULE ORAL at 06:08

## 2022-08-31 RX ADMIN — METHOCARBAMOL 750 MG: 750 TABLET ORAL at 03:08

## 2022-08-31 RX ADMIN — LIDOCAINE HYDROCHLORIDE 6 MG: 20 INJECTION, SOLUTION EPIDURAL; INFILTRATION; INTRACAUDAL; PERINEURAL at 08:08

## 2022-08-31 RX ADMIN — CEFAZOLIN SODIUM 2 G: 1 POWDER, FOR SOLUTION INTRAMUSCULAR; INTRAVENOUS at 08:08

## 2022-08-31 RX ADMIN — ASPIRIN 81 MG: 81 TABLET, COATED ORAL at 01:08

## 2022-08-31 RX ADMIN — SENNOSIDES AND DOCUSATE SODIUM 1 TABLET: 50; 8.6 TABLET ORAL at 08:08

## 2022-08-31 RX ADMIN — MUPIROCIN 1 G: 20 OINTMENT TOPICAL at 06:08

## 2022-08-31 RX ADMIN — FENTANYL CITRATE 25 MCG: 50 INJECTION INTRAMUSCULAR; INTRAVENOUS at 11:08

## 2022-08-31 RX ADMIN — ACETAMINOPHEN 1000 MG: 500 TABLET ORAL at 05:08

## 2022-08-31 RX ADMIN — METHOCARBAMOL 750 MG: 750 TABLET ORAL at 08:08

## 2022-08-31 RX ADMIN — CARBOXYMETHYLCELLULOSE SODIUM 2 DROP: 5 SOLUTION/ DROPS OPHTHALMIC at 09:08

## 2022-08-31 RX ADMIN — SODIUM CHLORIDE, SODIUM GLUCONATE, SODIUM ACETATE, POTASSIUM CHLORIDE, MAGNESIUM CHLORIDE, SODIUM PHOSPHATE, DIBASIC, AND POTASSIUM PHOSPHATE: .53; .5; .37; .037; .03; .012; .00082 INJECTION, SOLUTION INTRAVENOUS at 09:08

## 2022-08-31 RX ADMIN — MEPIVACAINE HYDROCHLORIDE 3.2 ML: 15 INJECTION, SOLUTION EPIDURAL; INFILTRATION at 08:08

## 2022-08-31 RX ADMIN — Medication 10 MG: at 08:08

## 2022-08-31 RX ADMIN — OXYCODONE 5 MG: 5 TABLET ORAL at 11:08

## 2022-08-31 RX ADMIN — POLYETHYLENE GLYCOL 3350 17 G: 17 POWDER, FOR SOLUTION ORAL at 01:08

## 2022-08-31 RX ADMIN — PROPOFOL 100 MCG/KG/MIN: 10 INJECTION, EMULSION INTRAVENOUS at 08:08

## 2022-08-31 RX ADMIN — SENNOSIDES AND DOCUSATE SODIUM 1 TABLET: 50; 8.6 TABLET ORAL at 01:08

## 2022-08-31 RX ADMIN — MUPIROCIN 1 G: 20 OINTMENT TOPICAL at 08:08

## 2022-08-31 RX ADMIN — PANTOPRAZOLE SODIUM 40 MG: 40 TABLET, DELAYED RELEASE ORAL at 01:08

## 2022-08-31 RX ADMIN — Medication 50 MCG: at 09:08

## 2022-08-31 RX ADMIN — PREGABALIN 75 MG: 75 CAPSULE ORAL at 06:08

## 2022-08-31 RX ADMIN — PREGABALIN 75 MG: 75 CAPSULE ORAL at 08:08

## 2022-08-31 RX ADMIN — METOPROLOL SUCCINATE 25 MG: 25 TABLET, EXTENDED RELEASE ORAL at 08:08

## 2022-08-31 RX ADMIN — VANCOMYCIN HYDROCHLORIDE 1000 MG: 1 INJECTION, POWDER, LYOPHILIZED, FOR SOLUTION INTRAVENOUS at 06:08

## 2022-08-31 RX ADMIN — TRANEXAMIC ACID 1000 MG: 100 INJECTION, SOLUTION INTRAVENOUS at 09:08

## 2022-08-31 RX ADMIN — SODIUM CHLORIDE: 9 INJECTION, SOLUTION INTRAVENOUS at 07:08

## 2022-08-31 RX ADMIN — TOPIRAMATE 100 MG: 25 TABLET, FILM COATED ORAL at 08:08

## 2022-08-31 RX ADMIN — Medication 50 MCG: at 10:08

## 2022-08-31 RX ADMIN — DEXAMETHASONE SODIUM PHOSPHATE 8 MG: 4 INJECTION INTRA-ARTICULAR; INTRALESIONAL; INTRAMUSCULAR; INTRAVENOUS; SOFT TISSUE at 08:08

## 2022-08-31 RX ADMIN — FAMOTIDINE 20 MG: 10 INJECTION, SOLUTION INTRAVENOUS at 08:08

## 2022-08-31 RX ADMIN — SODIUM CHLORIDE: 0.9 INJECTION, SOLUTION INTRAVENOUS at 12:08

## 2022-08-31 RX ADMIN — TRANEXAMIC ACID 1000 MG: 100 INJECTION, SOLUTION INTRAVENOUS at 08:08

## 2022-08-31 RX ADMIN — ACETAMINOPHEN 1000 MG: 500 TABLET ORAL at 06:08

## 2022-08-31 RX ADMIN — SODIUM CHLORIDE: 0.9 INJECTION, SOLUTION INTRAVENOUS at 05:08

## 2022-08-31 RX ADMIN — Medication 100 MCG: at 09:08

## 2022-08-31 NOTE — NURSING
Report received. Care assumed. Patient arrived to unit AAOx4 in hospital bed from PACU. VSS, IVF infusing. Dressing to (L) knee, CDI.  Pt lying supine in bed. Pt denies pain or any other concerns at this time. See assessment. Patient oriented to room. Bed in lowest position, side rails up x2, bed wheels locked and call light within reach.  Pt instructed to call for assistance, verbalized understanding. NADN. Will continue to monitor.

## 2022-08-31 NOTE — ANESTHESIA POSTPROCEDURE EVALUATION
Anesthesia Post Evaluation    Patient: Teresa Azar    Procedure(s) Performed: Procedure(s) (LRB):  ARTHROPLASTY, KNEE, TOTAL - Depuy-Attune - left - FITO (Left)    Final Anesthesia Type: spinal      Patient location during evaluation: PACU  Patient participation: Yes- Able to Participate  Level of consciousness: awake and alert and oriented  Post-procedure vital signs: reviewed and stable  Pain management: adequate  Airway patency: patent  JONY mitigation strategies: Multimodal analgesia  PONV status at discharge: No PONV  Anesthetic complications: no      Cardiovascular status: blood pressure returned to baseline and hemodynamically stable  Respiratory status: unassisted, spontaneous ventilation and room air  Hydration status: euvolemic  Follow-up not needed.          Vitals Value Taken Time   /68 08/31/22 1202   Temp 36.4 °C (97.5 °F) 08/31/22 1057   Pulse 62 08/31/22 1216   Resp 11 08/31/22 1216   SpO2 97 % 08/31/22 1216   Vitals shown include unvalidated device data.      Event Time   Out of Recovery 12:15:00         Pain/Yunior Score: Pain Rating Prior to Med Admin: 7 (8/31/2022 11:19 AM)  Pain Rating Post Med Admin: 7 (8/31/2022 11:19 AM)

## 2022-08-31 NOTE — NURSING TRANSFER
Nursing Transfer Note      8/31/2022     Reason patient is being transferred: recovery suites    Transfer To: 304    Transfer via bed    Transfer with fluids    Transported by rn, pct    Medicines sent: mupirocin    Any special needs or follow-up needed: none    Chart send with patient: Yes    Notified: spouse

## 2022-08-31 NOTE — PLAN OF CARE
Patient tolerated PT session well. Patient ambulated 80ft with RW and contact guard assistance . No LOB or SOB noted. Patient has an OPPT appointment on 2022.       Problem: Physical Therapy  Goal: Physical Therapy Goal  Description: Goals to be met by: 2022    Patient will increase functional independence with mobility by performin. Supine to sit with supervision  2. Sit to stand transfer with Supervision  3. Gait x300 feet with Supervision using Rolling Walker  4. Ascend/Descend 1 curb step with Stand by assistance using Rolling Walker  5. Lower extremity exercise program x30 reps per handout, with supervision        Outcome: Ongoing, Progressing

## 2022-08-31 NOTE — TRANSFER OF CARE
"Anesthesia Transfer of Care Note    Patient: Teresa Azar    Procedure(s) Performed: Procedure(s) (LRB):  ARTHROPLASTY, KNEE, TOTAL - Depuy-Attune - left - FITO (Left)    Patient location: PACU    Anesthesia Type: spinal    Transport from OR: Transported from OR on 6-10 L/min O2 by face mask with adequate spontaneous ventilation    Post pain: adequate analgesia    Post assessment: no apparent anesthetic complications    Post vital signs: stable    Level of consciousness: awake    Nausea/Vomiting: no nausea/vomiting    Complications: none    Transfer of care protocol was followed      Last vitals:   Visit Vitals  BP (!) 129/90 (BP Location: Right arm, Patient Position: Lying)   Pulse 60   Temp 37 °C (98.6 °F) (Oral)   Resp 16   Ht 5' 5.5" (1.664 m)   Wt 76.7 kg (169 lb)   LMP 06/30/2022 (Within Days)   SpO2 100%   Breastfeeding No   BMI 27.70 kg/m²     "

## 2022-08-31 NOTE — OP NOTE
Mario Left TKA  OPERATIVE NOTE    Date of Operation:   8/31/2022    Providers Performing:   Surgeon(s):  Jose Martin III, MD  Assistant: Roberto Quach MD    Operative Procedure:   Left Total Knee Arthroplasty, CPT 39066    Preoperative Diagnosis:   Left Knee Osteoarthritis, ICD-10 M17.12    Postoperative Diagnosis:   SAME    Components Used:   Depuy  Femur: Attune PS Size 3  Tibia: Attune fixed bearing Size 3  Patella: Attune Medialized Dome 32 mm  Tibial Insert: Attune fixed bearing PS inset size 6 mm  2 packs cement: Simplex P    Implant Name Type Inv. Item Serial No.  Lot No. LRB No. Used Action   CEMENT BONE WHOLE BATCH - [id number]  CEMENT BONE WHOLE BATCH  [employer] ENRI[employer]. [id number] Left 1 Implanted   CEMENT BONE WHOLE BATCH - [id number]  CEMENT BONE WHOLE BATCH  [employer] ENRIQUE. [id number] Left 1 Implanted   PATELLA ATTUNE MED 32MM - [id number]  PATELLA ATTUNE MED 32MM  [employer] [id number] Left 1 Implanted   BASEPLATE ATTUNE TIB FIX MANI 4 - [id number]  BASEPLATE ATTUNE TIB FIX MANI 4  [employer] [id number] Left 1 Implanted   attune femoral posterior stabilizer size 3 left      [id number] Left 1 Implanted   attune tibialinsert fixed bearing posterior stabilized sizw 3 6mm    [employer] [id number] Left 1 Implanted       Anesthesia:   Spinal    LENY cocktail: LINDSAY    Estimated Blood Loss:   350 cc    Specimens:   None    Complications:   None    Indications:   The patient has failed non-operative measures including medications, injections and activity modifications for their knee.  After an explanation of risks and benefits of knee arthroplasty surgery, the patient wishes to proceed with surgery.    Operative Notes:   The patient was greeted in the pre-op holding area.  The patients knee was marked with a surgical marker by the surgeon.  Spinal-Epidural anesthesia was administered by the anesthesia team.  The patient was placed in the supine position on the OR table with all bony prominences padded.  A  tourniquet was not used.  IV antibiotics were administered.  The leg was prepped and draped in the usual sterile fashion.  A timeout was performed and the correct patient, site and procedure were identified.    A midline incision was made with a standard medical parapatellar arthrotomy.  The standard medial capsular release was performed along with the lateral gutter.  The patella was mobilized from the lateral parapatellar ligament and bony osteophytes were removed.  The intercondylar notch was cleared of the ACL/PCL.    The extramedullary tibial alignment guide was placed in the appropriate slope and varus/valgus orientation and the proximal cutting block secured by pins.  Measured resection with a 8mm cut was accounted for from the high side of the plateau, perpendicular to the ankle.  The cut was made with an oscillating saw.  We then obtained access to the femoral canal with the stepped drill.  The intramedullary stephanie was placed in 6 degrees of valgus with a 9mm planned resection.  Our distal femoral cuts were made.  The knee was placed in extension and the medical and lateral meniscal remnants removed.  A spacer block was placed with the knee in extension checking for alignment and balance which we were happy with.    The knee was then brought into flexion and the distal femoral gap assessed for appropriate rotation.  Our distal femoral sizing guide was placed and sized appropriately.  Guide pins were placed in the appropriate external rotation.  This was assessed with the 4 in 1 cutting block and the flexion gap sizing block which was appropriate.  The distal femoral preparation was completed after the anterior, posterior and chamfer cuts were made.  The box cutting guide was placed and assessed.  The box cut was made.    At this time the trial implants were placed and knee assessed for stability, and flexion arc. The patella was prepared using free-hand technique and the three-holed lug drill guide was sized  and appropriately assessed. Patella tracking was found to be acceptable. The tibial component rotation was marked. The trials were removed. The tibial component was positioned and the keel was drilled and punched.    The wound was copiously irrigated with pulsitile lavage and the bony surfaces dried.  Cement was mixed on the back table and applied to all components.  Cementation of the femoral, tibial and patellar components was performed sequentially with removal of all excess cement. A trial insert was placed to provide compression while the cement dried and a 0.35% betadine solution was left to soak in the surgical field.     After the cement was dry, the trial poly insert was removed. Hemostasis was obtained with bovie electrocautery.  The knee was again copiously irrigated with pulsatile lavage. The final polyethylene insert was placed and the knee reduced.      1 gram of vancomycin powder was placed in the knee. The arthrotomy was closed with interrupted #1 vicryl suture and #2 quill, the subcuticular layer was closed with 2-0 vicryl suture and a running 4-0 monocryl was used to closed the skin surface.  Surgicel sealant was placed over the top of the incision and sterile dressing placed over the wound. Appropriately sized TEDs hose were placed for edema control.     Sponge and needle counts were correct.    The first-assistant was critical to all steps of the operation, including retraction and leg stabilization during exposure and bone preparation, as well as the deep and superficial wound closure.  Dr. Martin performed and/or supervised the key portions of this surgical procedure, including evaluation of the bone cuts, reshaping of the bony elements, and insertion of the provisional and final components.    Postoperative Plan:  The patient will be anticoagulated with 81mg aspirin twice daily for one month.   They will receive 24 hours of post-operative antibiotics.    Activity will be weight bearing as  tolerated.    Signed by: Jose Martin III, MD

## 2022-08-31 NOTE — HPI
CC:  Left knee pain     Teresa Azar is a 47 y.o. female with history of Left knee pain. Pain is worse with activity and weight bearing.  Patient has experienced interference of activities of daily living due to decreased range of motion and an increase in joint pain and swelling.  Patient has failed non-operative treatment including NSAIDs, corticosteroid injections, viscosupplement injections, and activity modification.  Teresa Azar currently ambulates independently.      Relevant medical conditions of significance in perioperative period:  HTN- on medication managed by pcp  Depression- on medication managed by ppc

## 2022-08-31 NOTE — PT/OT/SLP EVAL
"Occupational Therapy   Evaluation    Name: Teresa Azar  MRN: [id number]  Admitting Diagnosis:  Primary osteoarthritis of left knee Day of Surgery    Recommendations:     Discharge Recommendations: home  Discharge Equipment Recommendations:  bedside commode, walker, rolling  Barriers to discharge:  None    Assessment:     Teresa Azar is a 47 y.o. female with a medical diagnosis of Primary osteoarthritis of left knee.  She presents s/p left TKA. Patient completed bed mobility at contact guard assistance. She completed toilet transfers/task at contact guard assistance along with grooming task in standing. Patient would benefit from skilled OT needs s/p left TKA to increase independence with ADLs and ADL transfers. Performance deficits affecting function: weakness, impaired functional mobility, gait instability, impaired balance, impaired self care skills, decreased lower extremity function, decreased ROM.      Rehab Prognosis: Good; patient would benefit from acute skilled OT services to address these deficits and reach maximum level of function.       Plan:     Patient to be seen daily to address the above listed problems via self-care/home management, therapeutic exercises, therapeutic activities  Plan of Care Expires: 09/02/22  Plan of Care Reviewed with: patient, spouse    Subjective     Chief Complaint: "pain in knee"   Patient/Family Comments/goals: "go to work without pain and swelling and walk long distances"     Occupational Profile:  Living Environment: Patient lives with their spouse in 2 level home with 0 steps to enter and has a master on first floor with walk in shower   Previous level of function: independent with ADLs   Roles and Routines: 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  Patient found supine with cryotherapy, FCD, peripheral IV upon OT entry to room.    General Precautions: Standard, fall   Orthopedic Precautions:LLE weight bearing as tolerated   Braces: N/A     Occupational Performance:    Bed Mobility:    Patient completed Scooting/Bridging with contact guard assistance  Patient completed Supine to Sit with contact guard assistance    Functional Mobility/Transfers:  Patient completed Sit <> Stand Transfer with contact guard assistance  with  rolling walker   Patient completed Toilet Transfer Step Transfer technique with contact guard assistance with  rolling walker  Patient completed chair transfer with step transfer technique with contact guard assistance with rolling walker   Functional Mobility: patient ambulated to/from bathroom with use of rolling walker at contact guard assistance     Activities of Daily Living:  Grooming: contact guard assistance standing at sink to wash hands   Upper Body Dressing: minimum assistance sitting edge of bed to don gown for back, IV in   Toileting: contact guard assistance completed on bedside commode placed over toilet     Cognitive/Visual Perceptual:  Cognitive/Psychosocial Skills:     -       Oriented to: Person, Place, and Time   -       Follows Commands/attention:Follows multistep  commands    Physical Exam:  Upper Extremity Range of Motion:     -       Right Upper Extremity: WFL  -       Left Upper Extremity: WFL  Upper Extremity Strength:    -       Right Upper Extremity: WFL  -       Left Upper Extremity: WFL    AMPAC 6 Click ADL:  AMPAC Total Score: 19    Treatment & Education:  Patient educated on hand placement for transfers    Patient educated on rolling walker placement for ADLs  Patient educated on polar ice use   Patient educated on  role OT and plan of care s/p surgery at [facility], white board updated as needed       Patient left up in chair with all lines intact, call button in reach, RN notified, and  present    GOALS:   Multidisciplinary Problems       Occupational Therapy Goals          Problem: Occupational Therapy    Goal Priority Disciplines Outcome Interventions   Occupational Therapy Goal     OT, PT/OT Ongoing, Progressing    Description: Goals to be met by: 9/2/22     Patient will increase functional independence with ADLs by performing:    UE Dressing with Modified [place].  LE Dressing with Supervision.  Grooming while standing at sink with Modified [place].  Toileting from toilet with Modified Geary for hygiene and clothing management.   Toilet transfer to toilet with Modified Geary.                         History:     Past Medical History:   Diagnosis Date    Depression     Essential (primary) hypertension     GERD (gastroesophageal reflux disease)     Migraines     Primary osteoarthritis of left knee          Past Surgical History:   Procedure Laterality Date    ARTHROSCOPY OF BOTH KNEES      COLONOSCOPY  2019    ENDOMETRIAL ABLATION      SALPINGECTOMY Bilateral        Time Tracking:     OT Date of Treatment: 08/31/22  OT Start Time: 1424  OT Stop Time: 1443  OT Total Time (min): 19 min    Billable Minutes:Evaluation 10  Self Care/Home Management 9    Abena Jauregui OT  8/31/2022

## 2022-08-31 NOTE — PT/OT/SLP EVAL
Physical Therapy Evaluation and Treatment     Patient Name:  Teresa Azar   MRN:  [id number]    Recommendations:     Discharge Recommendations:  outpatient PT   Discharge Equipment Recommendations: bedside commode, walker, rolling   Barriers to discharge:  staying at the [facility] prior to going back to [place]    Assessment:     Teresa Azar is a 47 y.o. female admitted with a medical diagnosis of Primary osteoarthritis of left knee.  She presents with the following impairments/functional limitations:  weakness, impaired functional mobility, gait instability, impaired balance, decreased lower extremity function, pain, decreased ROM, impaired skin, orthopedic precautions. Patient tolerated PT session well. Patient ambulated 80ft with RW and contact guard assistance . No LOB or SOB noted. Patient has an OPPT appointment on 9/2/2022.    Rehab Prognosis: Good; patient would benefit from acute skilled PT services to address these deficits and reach maximum level of function.    Recent Surgery: Procedure(s) (LRB):  ARTHROPLASTY, KNEE, TOTAL - Depuy-Attune - left - FITO (Left) Day of Surgery    Plan:     During this hospitalization, patient to be seen daily to address the identified rehab impairments via gait training, therapeutic activities, therapeutic exercises and progress toward the following goals:    Plan of Care Expires:  09/02/22    Subjective     Chief Complaint: Left knee pain.   Patient/Family Comments/goals: To walk without pain.   Pain/Comfort:  Pain Rating 1: 6/10  Location - Side 1: Left  Location 1: knee  Pain Addressed 1: Pre-medicate for activity, Reposition, Distraction    Patients cultural, spiritual, Shinto conflicts given the current situation:  n/a    Living Environment:  Patient lives in a 2SH with no steps to enter. She is able to stay on the 1st floor. Prior to admission, patients level of function was independent for functional mobility.  Equipment used at home: none. Upon discharge,  patient will have assistance from .    Objective:     Communicated with RN prior to session.  Patient found up in chair with cryotherapy, FCD, peripheral IV  upon PT entry to room.  present in room.     General Precautions: Standard, fall   Orthopedic Precautions:LLE weight bearing as tolerated   Braces: N/A  Respiratory Status: Room air    Exams:  Cognitive Exam:  Patient is oriented to Person, Place, Time, and Situation  Gross Motor Coordination:  WFL  Sensation:    -       Intact  RLE ROM: WFL  RLE Strength: WFL  LLE ROM: WFL except limited at knee due to pain  LLE Strength: appears WFL but did not formally assess due to pain and recent surgery     Functional Mobility:  Transfers:     Sit to Stand:  contact guard assistance with rolling walker x1 from bedside chair   Gait: Patient ambulated 80ft with Rolling Walker and contact guard assistance  using 3-point gait. Patient demonstrated decreased jay and decreased step length during gait due to pain, decreased ROM, and decreased strength.    Therapeutic Activities and Exercises:  Patient and  educated in:  -PT role and POC  -safety with transfers including hand placement  -gait sequencing and RW management  -OOB activity to maximize recovery including ambulating with nursing staff assistance and RW while in the hospital       AM-PAC 6 CLICK MOBILITY  Total Score:18     Patient left up in chair with all lines intact, call button in reach, RN notified, and  present.    GOALS:   Multidisciplinary Problems       Physical Therapy Goals          Problem: Physical Therapy    Goal Priority Disciplines Outcome Goal Variances Interventions   Physical Therapy Goal     PT, PT/OT Ongoing, Progressing     Description: Goals to be met by: 2022    Patient will increase functional independence with mobility by performin. Supine to sit with supervision  2. Sit to stand transfer with Supervision  3. Gait x300 feet with Supervision using  Rolling Walker  4. Ascend/Descend 1 curb step with Stand by assistance using Rolling Walker  5. Lower extremity exercise program x30 reps per handout, with supervision                             History:     Past Medical History:   Diagnosis Date    Depression     Essential (primary) hypertension     GERD (gastroesophageal reflux disease)     Migraines     Primary osteoarthritis of left knee        Past Surgical History:   Procedure Laterality Date    ARTHROSCOPY OF BOTH KNEES      COLONOSCOPY  2019    ENDOMETRIAL ABLATION      SALPINGECTOMY Bilateral        Time Tracking:     PT Received On: 08/31/22  PT Start Time: 1550     PT Stop Time: 1606  PT Total Time (min): 16 min     Billable Minutes: Evaluation 8 and Gait Training 8      08/31/2022

## 2022-08-31 NOTE — PROGRESS NOTES
Acute Pain Service and Perioperative Surgical Home Progress Note    HPI  Teresa Azar is a 47 y.o., female, with HTN POD# 1 s/p left TKA.     Interval history      Surgery:  Procedure(s) (LRB):  ARTHROPLASTY, KNEE, TOTAL - Depuy-Attune - left - FITO (Left)    Post Op Day #: 1    Problem List:    Active Hospital Problems    Diagnosis  POA    *Primary osteoarthritis of left knee [M17.12]  Yes      Resolved Hospital Problems   No resolved problems to display.       Subjective:       General appearance of alert, oriented, no complaints   Pain with rest: 0   Pain with movement: 5    Numbers   Side Effects    1. Pruritis No    2. Nausea No    3. Motor Blockade No, 0=Ability to raise lower extremities off bed    4. Sedation No, 1=awake and alert    Schedule Medications:    acetaminophen  1,000 mg Oral Q6H    amlodipine-benazepril 2.5-10 mg  1 capsule Oral Daily    aspirin  81 mg Oral BID    ceFAZolin (ANCEF) IVPB  2 g Intravenous Q8H    celecoxib  200 mg Oral Daily    EScitalopram oxalate  10 mg Oral QHS    famotidine  20 mg Oral BID    methocarbamoL  750 mg Oral TID    metoprolol succinate  25 mg Oral Nightly    mupirocin  1 g Nasal BID    pantoprazole  40 mg Oral Daily    polyethylene glycol  17 g Oral Daily    pregabalin  75 mg Oral QHS    senna-docusate 8.6-50 mg  1 tablet Oral BID    topiramate  100 mg Oral QHS        Continuous Infusions:   sodium chloride 0.9% 150 mL/hr at 09/01/22 0033        PRN Medications:  bisacodyL, morphine, naloxone, ondansetron, oxyCODONE, oxyCODONE, prochlorperazine       Antibiotics:  Antibiotics (From admission, onward)      Start     Stop Route Frequency Ordered    08/31/22 1800  cefazolin (ANCEF) 2 gram in dextrose 5% 50 mL IVPB (premix)         -- IV Every 8 hours (non-standard times) 08/31/22 1740    08/31/22 1245  mupirocin 2 % ointment 1 g         09/05 0859 Nasl 2 times daily 08/31/22 1231               Objective:    Vital Signs (Most Recent):  Temp: 97.7 °F (36.5 °C) (09/01/22  0410)  Pulse: 60 (09/01/22 0410)  Resp: 16 (09/01/22 0410)  BP: 112/70 (09/01/22 0410)  SpO2: 98 % (09/01/22 0410) Vital Signs Range (Last 24H):  Temp:  [97.1 °F (36.2 °C)-98.6 °F (37 °C)]   Pulse:  [60-74]   Resp:  [11-20]   BP: (102-136)/(63-90)   SpO2:  [97 %-100 %]          I & O (Last 24H):  Intake/Output Summary (Last 24 hours) at 9/1/2022 0552  Last data filed at 9/1/2022 0124  Gross per 24 hour   Intake 3004 ml   Output 2600 ml   Net 404 ml       Physical Exam:    GA: Alert, comfortable, no acute distress.   Pulmonary: Clear to auscultation A/P/L. No wheezing, crackles, or rhonchi.  Cardiac: RRR S1 & S2 w/o rubs/murmurs/gallops.   Abdominal:Bowel sounds present. No tenderness to palpation or distension. No appreciable hepatosplenomegaly.   Skin: No jaundice, rashes, or visible lesions.         Laboratory:  CBC: No results for input(s): WBC, RBC, HGB, HCT, PLT, MCV, MCH, MCHC in the last 72 hours.    BMP: No results for input(s): NA, K, CO2, CL, BUN, CREATININE, GLU, MG, PHOS, CALCIUM in the last 72 hours.    No results for input(s): PT, INR, PROTIME, APTT in the last 72 hours.      Anticoagulant dose Aspirin 81mg at 2046.    Assessment:    Teresa Azar is a 47 y.o., female, with HTN POD# 1 s/p left TKA.      Pain control adequate    Plan:     1) Pain-  Multimodal pain regimen with acetaminophen, Lyrica, and prn oxycodone given  Will continue to monitor. Plan to discharge with Nimbus pain pump on POD #1.   2) HTN- well controlled- continue home regimen at discharge   3) FEN/GI: Tolerating regular diet   4) Dispo: Pt working well with PT/OT. Case management and SW for placement. Plan for discharge POD #1.        Evaluator CAROLYN Alves

## 2022-08-31 NOTE — ANESTHESIA PROCEDURE NOTES
Spinal    Diagnosis: Osteoarthritis  Patient location during procedure: OR  Start time: 8/31/2022 8:35 AM  Timeout: 8/31/2022 8:34 AM  End time: 8/31/2022 8:36 AM    Staffing  Authorizing Provider: Alvarez Rivera MD  Performing Provider: Alvarez Rivera MD    Preanesthetic Checklist  Completed: patient identified, IV checked, site marked, risks and benefits discussed, surgical consent, monitors and equipment checked, pre-op evaluation and timeout performed  Spinal Block  Patient position: sitting  Prep: ChloraPrep  Patient monitoring: heart rate, continuous pulse ox and frequent blood pressure checks  Approach: midline  Location: L3-4  Injection technique: single shot  CSF Fluid: clear free-flowing CSF  Needle  Needle type: David   Needle gauge: 25 G  Needle length: 3.5 in  Additional Documentation: incremental injection, negative aspiration for heme and no paresthesia on injection  Needle localization: anatomical landmarks  Assessment  Ease of block: easy  Patient's tolerance of the procedure: comfortable throughout block  Medications:    Medications: mepivacaine (CARBOCAINE) injection 15 mg/mL (1.5%) - Other   3.2 mL - 8/31/2022 8:36:00 AM

## 2022-08-31 NOTE — PLAN OF CARE
Problem: Occupational Therapy  Goal: Occupational Therapy Goal  Description: Goals to be met by: 9/2/22     Patient will increase functional independence with ADLs by performing:    UE Dressing with Modified [place].  LE Dressing with Supervision.  Grooming while standing at sink with Modified [place].  Toileting from toilet with Modified [place] for hygiene and clothing management.   Toilet transfer to toilet with Modified [place].    Outcome: Ongoing, Progressing    OT evaluation with goals established. Patient completed bed mobility at contact guard assistance. She completed toilet transfers/task at contact guard assistance along with grooming task in standing.

## 2022-09-01 ENCOUNTER — TELEPHONE (OUTPATIENT)
Dept: ORTHOPEDICS | Facility: CLINIC | Age: 47
End: 2022-09-01
Payer: COMMERCIAL

## 2022-09-01 VITALS
WEIGHT: 169 LBS | TEMPERATURE: 98 F | OXYGEN SATURATION: 100 % | BODY MASS INDEX: 27.16 KG/M2 | HEART RATE: 57 BPM | DIASTOLIC BLOOD PRESSURE: 78 MMHG | RESPIRATION RATE: 18 BRPM | HEIGHT: 66 IN | SYSTOLIC BLOOD PRESSURE: 119 MMHG

## 2022-09-01 PROCEDURE — 25000003 PHARM REV CODE 250: Performed by: NURSE PRACTITIONER

## 2022-09-01 PROCEDURE — 97535 SELF CARE MNGMENT TRAINING: CPT

## 2022-09-01 PROCEDURE — 94761 N-INVAS EAR/PLS OXIMETRY MLT: CPT

## 2022-09-01 PROCEDURE — 99900035 HC TECH TIME PER 15 MIN (STAT)

## 2022-09-01 PROCEDURE — 25000003 PHARM REV CODE 250: Performed by: SURGERY

## 2022-09-01 PROCEDURE — 97116 GAIT TRAINING THERAPY: CPT

## 2022-09-01 PROCEDURE — 97110 THERAPEUTIC EXERCISES: CPT

## 2022-09-01 PROCEDURE — 63600175 PHARM REV CODE 636 W HCPCS: Performed by: ORTHOPAEDIC SURGERY

## 2022-09-01 RX ORDER — CELECOXIB 200 MG/1
200 CAPSULE ORAL DAILY
Status: DISCONTINUED | OUTPATIENT
Start: 2022-09-01 | End: 2022-09-01 | Stop reason: HOSPADM

## 2022-09-01 RX ADMIN — MUPIROCIN 1 G: 20 OINTMENT TOPICAL at 09:09

## 2022-09-01 RX ADMIN — ACETAMINOPHEN 1000 MG: 500 TABLET ORAL at 12:09

## 2022-09-01 RX ADMIN — CEFAZOLIN SODIUM 2 G: 2 SOLUTION INTRAVENOUS at 11:09

## 2022-09-01 RX ADMIN — SENNOSIDES AND DOCUSATE SODIUM 1 TABLET: 50; 8.6 TABLET ORAL at 09:09

## 2022-09-01 RX ADMIN — CEFAZOLIN SODIUM 2 G: 2 SOLUTION INTRAVENOUS at 01:09

## 2022-09-01 RX ADMIN — ASPIRIN 81 MG: 81 TABLET, COATED ORAL at 09:09

## 2022-09-01 RX ADMIN — OXYCODONE 5 MG: 5 TABLET ORAL at 01:09

## 2022-09-01 RX ADMIN — OXYCODONE 5 MG: 5 TABLET ORAL at 11:09

## 2022-09-01 RX ADMIN — OXYCODONE 5 MG: 5 TABLET ORAL at 07:09

## 2022-09-01 RX ADMIN — ACETAMINOPHEN 1000 MG: 500 TABLET ORAL at 06:09

## 2022-09-01 RX ADMIN — METHOCARBAMOL 750 MG: 750 TABLET ORAL at 09:09

## 2022-09-01 RX ADMIN — POLYETHYLENE GLYCOL 3350 17 G: 17 POWDER, FOR SOLUTION ORAL at 09:09

## 2022-09-01 RX ADMIN — SODIUM CHLORIDE: 0.9 INJECTION, SOLUTION INTRAVENOUS at 12:09

## 2022-09-01 RX ADMIN — CELECOXIB 200 MG: 200 CAPSULE ORAL at 09:09

## 2022-09-01 RX ADMIN — PANTOPRAZOLE SODIUM 40 MG: 40 TABLET, DELAYED RELEASE ORAL at 09:09

## 2022-09-01 NOTE — ASSESSMENT & PLAN NOTE
Teresa Azar is a 47 y.o. female who is s/p L TKA on 8/31    Surgical dressing C/D/I, bulky dressing taken down today, polar ice in place  Pain control: multimodal, Anesthesia Surgical Home following  PT/OT: WBAT LLE  DVT PPx: ASA 81 BID, FCDs at all times when not ambulating  Abx: postop Ancef  Drain: none  Novoa: none    Dispo: plan to dc to home today once cleared by PT/OT

## 2022-09-01 NOTE — PROGRESS NOTES
[facility])  Orthopedics  Progress Note    Patient Name: Teresa Azar  MRN: [id number]  Admission Date: 8/31/2022  Hospital Length of Stay: 1 days  Attending Provider: Jose Martin III, MD  Primary Care Provider: Sarthak Cheung MD  Follow-up For: Procedure(s) (LRB):  ARTHROPLASTY, KNEE, TOTAL - Depuy-Attune - left - FITO (Left)    Post-Operative Day: 1 Day Post-Op  Subjective:     Principal Problem:Primary osteoarthritis of left knee    Principal Orthopedic Problem: same, s/p L TKA 8/31    Interval History: Pt seen and examined at bedside. NAEON, VSS, AF. Pain controlled. Denies fevers, chills, chest pain, SOB, N/V/D.   80 feet with PT.    Review of patient's allergies indicates:  No Known Allergies    Current Facility-Administered Medications   Medication    0.9%  NaCl infusion    acetaminophen tablet 1,000 mg    amlodipine-benazepril 2.5-10 mg per capsule 1 capsule    aspirin EC tablet 81 mg    bisacodyL suppository 10 mg    cefazolin (ANCEF) 2 gram in dextrose 5% 50 mL IVPB (premix)    celecoxib capsule 200 mg    EScitalopram oxalate tablet 10 mg    famotidine tablet 20 mg    methocarbamoL tablet 750 mg    metoprolol succinate (TOPROL-XL) 24 hr tablet 25 mg    morphine injection 2 mg    mupirocin 2 % ointment 1 g    naloxone 0.4 mg/mL injection 0.02 mg    ondansetron injection 4 mg    oxyCODONE immediate release tablet 5 mg    oxyCODONE immediate release tablet Tab 10 mg    pantoprazole EC tablet 40 mg    polyethylene glycol packet 17 g    pregabalin capsule 75 mg    prochlorperazine injection Soln 5 mg    senna-docusate 8.6-50 mg per tablet 1 tablet    topiramate tablet 100 mg     Objective:     Vital Signs (Most Recent):  Temp: 97.7 °F (36.5 °C) (09/01/22 0410)  Pulse: 60 (09/01/22 0410)  Resp: 16 (09/01/22 0410)  BP: 112/70 (09/01/22 0410)  SpO2: 98 % (09/01/22 0410) Vital Signs (24h Range):  Temp:  [97.1 °F (36.2 °C)-98.6 °F (37 °C)] 97.7 °F (36.5 °C)  Pulse:   "[60-74] 60  Resp:  [11-20] 16  SpO2:  [97 %-100 %] 98 %  BP: (102-136)/(63-90) 112/70     Weight: 76.7 kg (169 lb)  Height: 5' 5.5" (166.4 cm)  Body mass index is 27.7 kg/m².      Intake/Output Summary (Last 24 hours) at 9/1/2022 0552  Last data filed at 9/1/2022 0124  Gross per 24 hour   Intake 3004 ml   Output 2600 ml   Net 404 ml       Ortho/SPM Exam  AAOx4  NAD  Reg rate  No increased WOB    LLE:  Dressing c/d/i  Ice in place  SILT T/SP/DP/Parsons/Sa  Motor intact T/SP/DP  WWP extremities  FCDs in place and functioning    Significant Labs: All pertinent labs within the past 24 hours have been reviewed.    Significant Imaging: I have reviewed all pertinent imaging results/findings.    Assessment/Plan:     * Primary osteoarthritis of left knee  Teresa Azar is a 47 y.o. female who is s/p L TKA on 8/31    Surgical dressing C/D/I, bulky dressing taken down today, polar ice in place  Pain control: multimodal, Anesthesia Surgical Home following  PT/OT: WBAT LLE  DVT PPx: ASA 81 BID, FCDs at all times when not ambulating  Abx: postop Ancef  Drain: none  Novoa: none    Dispo: plan to dc to home today once cleared by PT/OT            Roberto Quach MD  Orthopedics  [place] [facility] ([facility])  "

## 2022-09-01 NOTE — HOSPITAL COURSE
On 8/31, the patient arrived to the [facility] for proper pre-operative management.  Upon completion of pre-operative preparation, the patient was taken back to the operative theatre. L TKA was performed without complication and the patient was transported to the post anesthesia care unit in stable condition.  After appropriate recovery from the anaesthetic agents used during the surgery, the patient was then transported to the hospital inpatient floor.  The interim of the hospital stay from arrival on the floor up to discharge has been uncomplicated. The patient has tolerated regular diet.  The patient's pain has been controlled using a multimodal approach. Currently, the patient's pain is well controlled on an oral regimen.  The patient has been voiding without difficulty.  The patient began participation in physical therapy after surgery and has progressed throughout the entire hospital stay.  Currently, the patient's progress is sufficient to allow the them to be discharged to home safely.  The patient agrees with this assessment and desires a discharge today. ASA BID for DVT Ppx.

## 2022-09-01 NOTE — DISCHARGE SUMMARY
[facility])  Orthopedics  Discharge Summary      Patient Name: Teresa Azar  MRN: [id number]  Admission Date: 8/31/2022  Hospital Length of Stay: 1 days  Discharge Date and Time: No discharge date for patient encounter.  Attending Physician: Jose Martin III, MD   Discharging Provider: Roberto Quach MD  Primary Care Provider: Sarthak Cheung MD    HPI:   CC:  Left knee pain     Teresa Azar is a 47 y.o. female with history of Left knee pain. Pain is worse with activity and weight bearing.  Patient has experienced interference of activities of daily living due to decreased range of motion and an increase in joint pain and swelling.  Patient has failed non-operative treatment including NSAIDs, corticosteroid injections, viscosupplement injections, and activity modification.  Teresa Azar currently ambulates independently.      Relevant medical conditions of significance in perioperative period:  HTN- on medication managed by pcp  Depression- on medication managed by ppc         Procedure(s) (LRB):  ARTHROPLASTY, KNEE, TOTAL - Depuy-Attune - left - FITO (Left)      Hospital Course:  On 8/31, the patient arrived to the [facility] for proper pre-operative management.  Upon completion of pre-operative preparation, the patient was taken back to the operative theatre. L TKA was performed without complication and the patient was transported to the post anesthesia care unit in stable condition.  After appropriate recovery from the anaesthetic agents used during the surgery, the patient was then transported to the hospital inpatient floor.  The interim of the hospital stay from arrival on the floor up to discharge has been uncomplicated. The patient has tolerated regular diet.  The patient's pain has been controlled using a multimodal approach. Currently, the patient's pain is well controlled on an oral regimen.  The patient has been voiding without difficulty.  The patient  began participation in physical therapy after surgery and has progressed throughout the entire hospital stay.  Currently, the patient's progress is sufficient to allow the them to be discharged to home safely.  The patient agrees with this assessment and desires a discharge today. ASA BID for DVT Ppx.         Goals of Care Treatment Preferences:         Consults (From admission, onward)        Status Ordering Provider     Inpatient consult to Social Work  Once        Provider:  (Not yet assigned)    Acknowledged GRIFFIN MUNOZ     Inpatient consult to Pain Management  Once        Provider:  (Not yet assigned)    Acknowledged GRIFFIN MUNOZ     Inpatient consult to Respiratory Care  Once        Provider:  (Not yet assigned)    Acknowledged GRIFFIN MUNOZ          Significant Diagnostic Studies: No pertinent studies.    Pending Diagnostic Studies:     None        Final Active Diagnoses:    Diagnosis Date Noted POA    PRINCIPAL PROBLEM:  Primary osteoarthritis of left knee [M17.12] 08/31/2022 Yes      Problems Resolved During this Admission:      Discharged Condition: good    Disposition: Home or Self Care    Follow Up:   Follow-up Information     Jose Martin III, MD Follow up in 2 week(s).    Specialty: Orthopedic Surgery  Why: For wound re-check  Contact information:  [address] [place] [id number]  [phone]                       Patient Instructions:      Activity as tolerated     Sponge bath only until clinic visit     Keep surgical extremity elevated     Lifting restrictions   Order Comments: No strenuous exercise or lifting of > 10 lbs     Weight bearing as tolerated     No driving, operating heavy equipment or signing legal documents while taking pain medication     Leave dressing on - Keep it clean, dry, and intact until clinic visit   Order Comments: Do not remove surgical dressing for 2 weeks post-op. This will be done only by MD at initial post-op visit. If dressing is completely  saturated, replace with identical dressing - silver-impregnated hydrocolloid dressing.     Do not get dressings wet. Do not shower.     If dressing continues to be saturated or there are signs of infection, please call [facility] [phone] for further instructions and to make appt to be seen.     Call MD for:  temperature >100.4     Call MD for:  persistent nausea and vomiting     Call MD for:  severe uncontrolled pain     Call MD for:  difficulty breathing, headache or visual disturbances     Call MD for:  redness, tenderness, or signs of infection (pain, swelling, redness, odor or green/yellow discharge around incision site)     Call MD for:  hives     Call MD for:  persistent dizziness or light-headedness     Call MD for:  extreme fatigue     Medications:  Reconciled Home Medications:      Medication List      START taking these medications    acetaminophen 650 MG Tbsr  Commonly known as: TYLENOL  Take 1 tablet (650 mg total) by mouth every 6 to 8 hours as needed (pain).     aspirin 81 MG EC tablet  Commonly known as: ECOTRIN  Take 1 tablet (81 mg total) by mouth 2 (two) times daily.     celecoxib 200 MG capsule  Commonly known as: CeleBREX  Take 1 capsule (200 mg total) by mouth once daily.     docusate sodium 100 MG capsule  Commonly known as: COLACE  Take 1 capsule (100 mg total) by mouth 2 (two) times daily as needed for Constipation.     methocarbamoL 750 MG Tab  Commonly known as: ROBAXIN  Take 1 tablet (750 mg total) by mouth 3 (three) times daily as needed (muscle spasms).     oxyCODONE 5 MG immediate release tablet  Commonly known as: ROXICODONE  Take 1-2 tablets by mouth every 4-6 hours as needed for pain        CONTINUE taking these medications    amlodipine-benazepril 2.5-10 mg 2.5-10 mg per capsule  Commonly known as: LOTREL  Take 1 capsule by mouth once daily.     EScitalopram oxalate 10 MG tablet  Commonly known as: LEXAPRO  Take 10 mg by mouth once daily.     lansoprazole 15 MG  capsule  Commonly known as: PREVACID  Take 30 mg by mouth once daily.     metoprolol succinate 25 MG 24 hr tablet  Commonly known as: TOPROL-XL  Take 25 mg by mouth nightly.     topiramate 100 MG tablet  Commonly known as: TOPAMAX  Take 100 mg by mouth every evening.            Roberto Quach MD  Orthopedics  [facility])

## 2022-09-01 NOTE — TELEPHONE ENCOUNTER
Met with patient this morning at [facility] - Pain well controlled, has been up OOB, PT/OT to see her today, discussed workflow for remainder of stay.  Discussed medications, equipment, icing, elevation, and numbers to call for assistance. Time allowed for Q&A     Pt asking about bedside commode - email sent to  [facility] [place] [facility] than patient taking on plane

## 2022-09-01 NOTE — DISCHARGE INSTRUCTIONS
"You will be discharged on a "multi-modal" pain regimen. This means that different medications are used to control your pain. Each medication works differently to control your pain so that your pain control is optimized.    You will be discharged on Tylenol (Acetaminophen), Robaxin (Methocarbamol), Celebrex (Celecoxib) or Mobic (Meloxicam), and Roxicodone (Oxycodone). The only opioid medication is Roxicodone (Oxycodone). Do not drive while taking opioid pain medication.     You may also be discharged with a perineural catheter (PNC). This is a way to deliver medicine that numbs the nerves in the area of your surgery to decrease your pain. If you experience any difficulty with this, call the on-call nursing hotline ([phone]) and ask to speak with anesthesia.     You will also be discharged on Aspirin twice daily or Eliquis (Apixaban) twice daily. This is to prevent blood clots which may be harmful.     If you have any questions regarding your medications, please to not hesitate to contact us.    You are able to bear full weight on the operative extremity, also called "weight bearing as tolerated." Your physical therapist will have gone over any precautions you may have and how to safely move after surgery.    Do not remove surgical dressing for 2 weeks post-op. This will be done only by MD at initial post-op visit.    Patient may shower at home. Dry area around surgical dressing well afterward. No submerging underwater (bathing, swimming, hot tub) for 1 month.     If dressing becomes saturated with drainage or there are signs of infection, please call [facility] [phone] for further instructions and to make appt to be seen.     Call us if you experience: fever, chills, chest pain, shortness of breath, severe headache, pain not relieved by oral medications, increased pain and swelling at the operative site, or any other questions or concerns. We are happy to assist you at any time.    "

## 2022-09-01 NOTE — PT/OT/SLP PROGRESS
"Occupational Therapy   Treatment/Discharge    Name: Teresa Azar  MRN: [id number]  Admitting Diagnosis:  Primary osteoarthritis of left knee  1 Day Post-Op    Recommendations:     Discharge Recommendations: home  Discharge Equipment Recommendations:  bedside commode, walker, rolling  Barriers to discharge:  None    Assessment:     Teresa Azar is a 47 y.o. female with a medical diagnosis of Primary osteoarthritis of left knee.  She presents with left TKA. She completed lower body dressing at supervision and further ADLs at modified independence. She is appropriate for discharge home/[facility]. Performance deficits affecting function are weakness, impaired functional mobility, gait instability, impaired balance, impaired self care skills, decreased lower extremity function, decreased ROM.     Rehab Prognosis:  Good; patient would benefit from acute skilled OT services to address these deficits and reach maximum level of function.       Plan:     DC from OT    Subjective     "Slept some overnight"     Pain/Comfort:  Pain Rating 1: 3/10  Location - Side 1: Left  Location - Orientation 1: generalized  Location 1: knee  Pain Addressed 1: Pre-medicate for activity, Reposition, Distraction    Objective:     Communicated with: RN prior to session.  Patient found supine with cryotherapy, FCD upon OT entry to room.    General Precautions: Standard, fall   Orthopedic Precautions:LLE weight bearing as tolerated   Braces: N/A  Respiratory Status: Room air     Occupational Performance:     Bed Mobility:    Patient completed Scooting/Bridging with modified independence  Patient completed Supine to Sit with modified independence     Functional Mobility/Transfers:  Patient completed Sit <> Stand Transfer with modified independence  with  rolling walker   Patient completed Toilet Transfer Step Transfer technique with modified independence with  rolling walker  Patient completed chair transfer with step transfer technique with " modified independence with rolling walker   Functional Mobility: patient ambulated to/from bathroom with use of rolling walker at supervision     Activities of Daily Living:  Grooming: modified independence standing at sink to wash hands, brush teeth  Upper Body Dressing: modified independence sitting in chair to don overhead shirt   Lower Body Dressing: supervision sitting in chair to don underwear/shorts, doff/shante socks and don shoes   Toileting: modified independence completed on bedside commode placed over toilet       [facility] 6 Click ADL: 23    Treatment & Education:  -Patient educated to dress surgical side first and further dressing techniques s/p surgery   -Patient educated on hand placement for transfers   -Patient educated on rolling walker placement for ADLs  -Patient educated on proper foot wear s/p surgery   -Patient educated on car transfers s/p surgery  Patient educated on role OT and plan of care s/p surgery at [facility], white board updated as needed     Patient left up in chair with call button in reach, RN notified,  present, and ice donned     GOALS:   Multidisciplinary Problems       Occupational Therapy Goals          Problem: Occupational Therapy    Goal Priority Disciplines Outcome Interventions   Occupational Therapy Goal     OT, PT/OT Ongoing, Progressing    Description: Goals to be met by: 9/2/22     Patient will increase functional independence with ADLs by performing:    UE Dressing with Modified [place].  LE Dressing with Supervision.  Grooming while standing at sink with Modified [place].  Toileting from toilet with Modified [place] for hygiene and clothing management.   Toilet transfer to toilet with Modified [place].                         Time Tracking:     OT Date of Treatment: 09/01/22  OT Start Time: 1204  OT Stop Time: 1227  OT Total Time (min): 23 min    Billable Minutes:Self Care/Home Management 23 9/1/2022

## 2022-09-01 NOTE — PT/OT/SLP PROGRESS
"Physical Therapy Treatment and Discharge     Patient Name:  Teresa Azar   MRN:  [id number]    Recommendations:     Discharge Recommendations:  outpatient PT   Discharge Equipment Recommendations: bedside commode, walker, rolling   Barriers to discharge:  going to stay at the [facility] prior to going back [place]    Assessment:     Teresa Azar is a 47 y.o. female admitted with a medical diagnosis of Primary osteoarthritis of left knee.  She presents with the following impairments/functional limitations:  weakness, impaired functional mobility, gait instability, impaired balance, decreased lower extremity function, pain, decreased ROM, impaired skin, orthopedic precautions. Patient tolerated PT session well. Patient ambulated 180ft x2 trials with RW and supervision. No LOB or SOB noted. Patient ascended/descended 6" curb step with RW and contact guard assistance. Patient performed L LE therex x10 reps. Patient has an OPPT appointment on 9/2/2022. Patient ready to discharge home from PT standpoint.     Rehab Prognosis: Good; patient would benefit from acute skilled PT services to address these deficits and reach maximum level of function.    Recent Surgery: Procedure(s) (LRB):  ARTHROPLASTY, KNEE, TOTAL - Depuy-Attune - left - FITO (Left) 1 Day Post-Op    Plan:     During this hospitalization, patient to be seen daily to address the identified rehab impairments via gait training, therapeutic activities, therapeutic exercises and progress toward the following goals:    Plan of Care Expires:  09/02/22    Subjective     Chief Complaint: Left knee pain.   Patient/Family Comments/goals: To walk without pain.   Pain/Comfort:  Pain Rating 1: 5/10  Location - Side 1: Left  Location 1: knee  Pain Addressed 1: Pre-medicate for activity, Reposition, Distraction      Objective:     Communicated with RN prior to session.  Patient found up in chair with cryotherapy upon PT entry to room.  present in room.     General " "Precautions: Standard, fall   Orthopedic Precautions:LLE weight bearing as tolerated   Braces: N/A  Respiratory Status: Room air     Functional Mobility:  Mat Mobility:     Supine to Sit: supervision  Sit to Supine: supervision  Transfers:     Sit to Stand:  supervision with rolling walker x1 from bedside chair and x1 from mat   Gait: Patient ambulated 180ft x2 trials with Rolling Walker and supervision  using swing-through gait. Patient demonstrated decreased jay and decreased step length during gait due to pain, decreased ROM, and decreased strength.  Stairs: Patient ascended/descended 6" curb step with RW and contact guard assistance.     AM-PAC 6 CLICK MOBILITY  Turning over in bed (including adjusting bedclothes, sheets and blankets)?: 4  Sitting down on and standing up from a chair with arms (e.g., wheelchair, bedside commode, etc.): 4  Moving from lying on back to sitting on the side of the bed?: 4  Moving to and from a bed to a chair (including a wheelchair)?: 4  Need to walk in hospital room?: 4  Climbing 3-5 steps with a railing?: 4  Basic Mobility Total Score: 24       Therapeutic Activities and Exercises:  Patient educated in and performed L LE exercises x10 reps for ankle pumps, quad set, glute set, SAQ over bolster, heel slides, hip abd/add, SLR, and LAQ.    Patient educated in:  -PT role and POC  -safety with transfers including hand placement  -gait sequencing and RW management  -OOB activity to maximize recovery including ambulating at home to prevent DVT   -car transfer  -curb training  -HEP for therex at home with handout provided       Patient left up in chair with all lines intact, call button in reach, RN notified, and  present.    GOALS:   Multidisciplinary Problems       Physical Therapy Goals          Problem: Physical Therapy    Goal Priority Disciplines Outcome Goal Variances Interventions   Physical Therapy Goal     PT, PT/OT Ongoing, Progressing     Description: Goals to be " met by: 2022    Patient will increase functional independence with mobility by performin. Supine to sit with supervision  2. Sit to stand transfer with Supervision  3. Gait x300 feet with Supervision using Rolling Walker  4. Ascend/Descend 1 curb step with Stand by assistance using Rolling Walker  5. Lower extremity exercise program x30 reps per handout, with supervision                             Time Tracking:     PT Received On: 22  PT Start Time: 1237     PT Stop Time: 1300  PT Total Time (min): 23 min     Billable Minutes: Gait Training 13 and Therapeutic Exercise 10    Treatment Type: Treatment  PT/PTA: PT       2022

## 2022-09-02 ENCOUNTER — TELEPHONE (OUTPATIENT)
Dept: ORTHOPEDICS | Facility: CLINIC | Age: 47
End: 2022-09-02
Payer: COMMERCIAL

## 2022-09-02 ENCOUNTER — CLINICAL SUPPORT (OUTPATIENT)
Dept: REHABILITATION | Facility: HOSPITAL | Age: 47
End: 2022-09-02
Attending: ORTHOPAEDIC SURGERY
Payer: COMMERCIAL

## 2022-09-02 DIAGNOSIS — M17.12 PRIMARY OSTEOARTHRITIS OF LEFT KNEE: ICD-10-CM

## 2022-09-02 DIAGNOSIS — R26.9 GAIT DIFFICULTY: ICD-10-CM

## 2022-09-02 DIAGNOSIS — R60.9 EDEMA, UNSPECIFIED TYPE: Primary | ICD-10-CM

## 2022-09-02 DIAGNOSIS — R68.89 DECREASED FUNCTIONAL ACTIVITY TOLERANCE: ICD-10-CM

## 2022-09-02 DIAGNOSIS — M25.60 DECREASED MOBILITY OF JOINT: ICD-10-CM

## 2022-09-02 PROCEDURE — 97161 PT EVAL LOW COMPLEX 20 MIN: CPT | Mod: PO

## 2022-09-02 PROCEDURE — 97116 GAIT TRAINING THERAPY: CPT | Mod: PO

## 2022-09-02 PROCEDURE — 97110 THERAPEUTIC EXERCISES: CPT | Mod: PO

## 2022-09-02 PROCEDURE — 97140 MANUAL THERAPY 1/> REGIONS: CPT | Mod: PO

## 2022-09-02 PROCEDURE — 97112 NEUROMUSCULAR REEDUCATION: CPT | Mod: PO

## 2022-09-02 NOTE — PROGRESS NOTES
[facility]   Physical Therapy Initial Evaluation     Date: 9/2/2022   Name: Teresa Azar  Clinic Number: [id number]    Therapy Diagnosis:   Encounter Diagnoses   Name Primary?    Primary osteoarthritis of left knee     Edema, unspecified type Yes    Decreased functional activity tolerance     Gait difficulty     Decreased mobility of joint      Physician: Jose Martin III, *    Physician Orders: PT Eval and Treat   Medical Diagnosis from Referral: M17.12 (ICD-10-CM) - Primary osteoarthritis of left knee  Evaluation Date: 9/2/2022  Authorization Period Expiration: 8/31/2023  Plan of Care Expiration: 10/2/2022  Progress Note Due: 9/6/2022  Visit # / Visits authorized: 1/ 1   FOTO: 1/2  Precautions: Standard     Time In: 8:00 am  Time Out: 9:00 am  Total Appointment Time (timed & untimed codes): 60 minutes (+ 10 minutes of cryotherapy)  SUBJECTIVE   Date of onset: 8/31/2022  History of current condition - Teresa reports: undergoing left-sided total knee replacement with Dr. Martin on 8/31/2022. Since then she has endorsed some nausea and lightheadedness, but this has gotten better. She is here with her  today from [place], [place].  Falls: None  Imaging, Radiographs: EXAMINATION:  XR KNEE 1 OR 2 VIEW LEFT     CLINICAL HISTORY:  S/P TKA;  Unilateral primary osteoarthritis, left knee     FINDINGS:  Knee two views left: There is a TKA in place good alignment and no complication seen.     Prior Therapy: None after surgery.  Social History:  Lives with their spouse  Occupation: Pharmacist  Prior Level of Function: Independent  Current Level of Function: Impairments consistent with status-post total knee arthroscopy.    Pain:  Current 4/10, worst 7/10, best 3/10   Location: left knee    Description: Aching and Dull  Aggravating Factors: Standing, Bending, Walking, Night Time, Morning, Extension, and Flexing  Easing Factors: ice, rest, and elevation    Patients goals: To improve job  duties and be able to ambulate to walk the dogs and ride her bike.     Medical History:   Past Medical History:   Diagnosis Date    Depression     Essential (primary) hypertension     GERD (gastroesophageal reflux disease)     Migraines     Primary osteoarthritis of left knee        Surgical History:   Teresa Azar  has a past surgical history that includes Arthroscopy of both knees; Salpingectomy (Bilateral); Endometrial ablation; Colonoscopy (2019); and Total knee arthroplasty (Left, 8/31/2022).    Medications:   Teresa has a current medication list which includes the following prescription(s): acetaminophen, amlodipine-benazepril 2.5-10 mg, aspirin, celecoxib, docusate sodium, escitalopram oxalate, lansoprazole, methocarbamol, metoprolol succinate, oxycodone, and topiramate.    Allergies:   Review of patient's allergies indicates:  No Known Allergies     OBJECTIVE   Observation: Patient presents with her , ambulating with a rolling walker with compression stocking donned and no Nimbus pain portal insertion. She ambulates without terminal knee extension during initial contact.  Vitals  Heart Rate: 62 bpm  SpO2: 96%  Blood Pressure: 104/76 mmHg    Integumentary: Bandage clean and intact and without evidence of drainage, wound dehiscence, or excessive bleeding.    Circumference:   Left   Knee Joint Line 42.2 cm   10 cm above 49.3 cm   10 cm below 31.9 cm       Knee Active Range of Motion   Right Left   Flexion Full range 70 degrees   Extension +5 degrees of hyperextension -5 degrees     Knee Passive Range of Motion   Right  Left End-Feel   Flexion Not tested. 75 degrees Restricted, boggy   Extension Not tested. 0 degrees      Joint Mobility  Tibiofemoral A/P [place] Not tested.   Tibiofemoral P/A [place] Not tested.   Patellofemoral Joint Mobility Boggy, no marked restriction.     Manual Muscle Testing  Poor quality quadriceps contraction.    Limitation/Restriction for FOTO Knee Survey    Therapist reviewed  "FOTO scores for Teresa Azar on 9/2/2022.   FOTO documents entered into [employer] - see Media section.    Limitation Score: 73%       TREATMENT     Total Treatment time (time-based codes) separate from Evaluation: 40 minutes      Teresa received the treatments listed below:    therapeutic exercises to develop strength, endurance, ROM, and flexibility for 10 minutes including:  Heel Slides, seated and supine with strap 10x5" hold each  Gastroc Stretch with Strap, seated, 10x10" hold  Glute Sets    manual therapy techniques: Joint mobilizations were applied for 5 minutes, including:  Patellar Mobilizations, all planes    neuromuscular re-education activities to improve: Balance, Kinesthetic, Sense, Proprioception, and Posture for 10 minutes. The following activities were included:  Quadriceps Sets, 20x5" hold, tactile cueing for facilitation  SAQ, 10x3" hold    therapeutic activities to improve functional performance for 10  minutes, including:  Sit to Stand Transfer, one hand on walker, one hand on stable support  Stair Navigation, 4-6" steps, step-to, non-reciprocal pattern, 1x trial without AD, bilateral hand-hold assistance    gait training to improve functional mobility and safety for 5  minutes, including:  Ambulating with Rolling Walk    cold pack for 10 minutes to left knee.    PATIENT EDUCATION AND HOME EXERCISES     Education provided:   - -Findings of evaluation and examination, and affect of these on plan for treatment  -Prognosis and expectations  -Role of PT and team-centered care for patient  -Home exercise program and expectations of therapy  -Te[facility] of [facility] and PT/PTA treatment    Written Home Exercises Provided: yes. Exercises were reviewed and Teresa was able to demonstrate them prior to the end of the session.  Teresa demonstrated good  understanding of the education provided. See EMR under Patient Instructions for exercises provided during therapy sessions.    ASSESSMENT     Teresa " is a 47 y.o. female referred to outpatient Physical Therapy with a medical diagnosis of  M17.12 (ICD-10-CM) - Primary osteoarthritis of left knee. Patient presents with limitations that are consistent with post-operative status of left total knee. She demonstrates -5 degrees of extension range of motion that was able to improve after manual therapy and treatment. She demonstrates 70 degrees of knee flexion. Patient exhibits gait difficulties, decreased active and passive range of motion, edema, pain and poor quadriceps contraction.    Patient prognosis is Excellent.   Patient will benefit from skilled outpatient Physical Therapy to address the deficits stated above and in the chart below, provide patient /family education, and to maximize patientt's level of independence.     Plan of care discussed with patient: Yes  Patient's spiritual, cultural and educational needs considered and patient is agreeable to the plan of care and goals as stated below:     Anticipated Barriers for therapy: None    Medical Necessity is demonstrated by the following  History  Co-morbidities and personal factors that may impact the plan of care Co-morbidities:   None    Personal Factors:   no deficits     low   Examination  Body Structures and Functions, activity limitations and participation restrictions that may impact the plan of care Body Regions:   lower extremities    Body Systems:    gross symmetry  ROM  strength  gross coordinated movement  balance  gait  transfers  transitions  motor control  motor learning  blood pressure  edema  scar formation  skin integrity    Participation Restrictions:     Activity limitations:   Learning and applying knowledge  no deficits    General Tasks and Commands  no deficits    Communication  no deficits    Mobility  lifting and carrying objects  walking  driving (bike, car, motorcycle)    Self care  washing oneself (bathing, drying, washing hands)  caring for body parts (brushing teeth, shaving,  grooming)  toileting  dressing    Domestic Life  shopping  cooking  doing house work (cleaning house, washing dishes, laundry)  assisting others    Interactions/Relationships  no deficits    Life Areas  employment    Community and Social Life  community life  recreation and leisure         low   Clinical Presentation stable and uncomplicated low   Decision Making/ Complexity Score: low     Goals:  Short Term Goals:   Short Term Goals: 3 days  1.) Patient will demonstrate independence in compliance and technique of home exercise program provided as per teach-back method of assessment.  2.) Patient will demonstrate no greater than -5 degree loss of knee extension upon conclusion of treatment.  3.) Patient will demonstrate 70 degrees of knee flexion upon conclusion of treatment.    PLAN   Plan of care Certification: 9/2/2022 to Short Term Goals: 3 days  Outpatient Physical Therapy 3 times weekly for 1 weeks to include the following interventions: Gait Training, Manual Therapy, Moist Heat/ Ice, Neuromuscular Re-ed, Patient Education, Self Care, Therapeutic Activities, and Therapeutic Exercise.     Emma Dumont PT, DPT, OCS  License Number: [id number]        I CERTIFY THE NEED FOR THESE SERVICES FURNISHED UNDER THIS PLAN OF TREATMENT AND WHILE UNDER MY CARE   Physician's comments:     Physician's Signature: ___________________________________________________

## 2022-09-02 NOTE — PLAN OF CARE
[facility]   Physical Therapy Initial Evaluation     Date: 9/2/2022   Name: Teresa Azar  Clinic Number: [id number]    Therapy Diagnosis:   Encounter Diagnoses   Name Primary?    Primary osteoarthritis of left knee     Edema, unspecified type Yes    Decreased functional activity tolerance     Gait difficulty     Decreased mobility of joint      Physician: Jose Martin III, *    Physician Orders: PT Eval and Treat   Medical Diagnosis from Referral: M17.12 (ICD-10-CM) - Primary osteoarthritis of left knee  Evaluation Date: 9/2/2022  Authorization Period Expiration: 8/31/2023  Plan of Care Expiration: 10/2/2022  Progress Note Due: 9/6/2022  Visit # / Visits authorized: 1/ 1   FOTO: 1/2  Precautions: Standard     Time In: 8:00 am  Time Out: 9:00 am  Total Appointment Time (timed & untimed codes): 60 minutes (+ 10 minutes of cryotherapy)  SUBJECTIVE   Date of onset: 8/31/2022  History of current condition - Teresa reports: undergoing left-sided total knee replacement with Dr. Martin on 8/31/2022. Since then she has endorsed some nausea and lightheadedness, but this has gotten better. She is here with her  today from [place], [place].  Falls: None  Imaging, Radiographs: EXAMINATION:  XR KNEE 1 OR 2 VIEW LEFT     CLINICAL HISTORY:  S/P TKA;  Unilateral primary osteoarthritis, left knee     FINDINGS:  Knee two views left: There is a TKA in place good alignment and no complication seen.     Prior Therapy: None after surgery.  Social History:  Lives with their spouse  Occupation: Pharmacist  Prior Level of Function: Independent  Current Level of Function: Impairments consistent with status-post total knee arthroscopy.    Pain:  Current 4/10, worst 7/10, best 3/10   Location: left knee    Description: Aching and Dull  Aggravating Factors: Standing, Bending, Walking, Night Time, Morning, Extension, and Flexing  Easing Factors: ice, rest, and elevation    Patients goals: To improve job  duties and be able to ambulate to walk the dogs and ride her bike.     Medical History:   Past Medical History:   Diagnosis Date    Depression     Essential (primary) hypertension     GERD (gastroesophageal reflux disease)     Migraines     Primary osteoarthritis of left knee        Surgical History:   Teresa Azar  has a past surgical history that includes Arthroscopy of both knees; Salpingectomy (Bilateral); Endometrial ablation; Colonoscopy (2019); and Total knee arthroplasty (Left, 8/31/2022).    Medications:   Teresa has a current medication list which includes the following prescription(s): acetaminophen, amlodipine-benazepril 2.5-10 mg, aspirin, celecoxib, docusate sodium, escitalopram oxalate, lansoprazole, methocarbamol, metoprolol succinate, oxycodone, and topiramate.    Allergies:   Review of patient's allergies indicates:  No Known Allergies     OBJECTIVE   Observation: Patient presents with her , ambulating with a rolling walker with compression stocking donned and no Nimbus pain portal insertion. She ambulates without terminal knee extension during initial contact.  Vitals  Heart Rate: 62 bpm  SpO2: 96%  Blood Pressure: 104/76 mmHg    Integumentary: Bandage clean and intact and without evidence of drainage, wound dehiscence, or excessive bleeding.    Circumference:   Left   Knee Joint Line 42.2 cm   10 cm above 49.3 cm   10 cm below 31.9 cm       Knee Active Range of Motion   Right Left   Flexion Full range 70 degrees   Extension +5 degrees of hyperextension -5 degrees     Knee Passive Range of Motion   Right  Left End-Feel   Flexion Not tested. 75 degrees Restricted, boggy   Extension Not tested. 0 degrees      Joint Mobility  Tibiofemoral A/P [place] Not tested.   Tibiofemoral P/A [place] Not tested.   Patellofemoral Joint Mobility [place], no marked restriction.     Manual Muscle Testing  Poor quality quadriceps contraction.    Limitation/Restriction for FOTO Knee Survey    Therapist reviewed  "FOTO scores for Teresa Azar on 9/2/2022.   FOTO documents entered into [employer] - see Media section.    Limitation Score: 73%       TREATMENT     Total Treatment time (time-based codes) separate from Evaluation: 40 minutes      Teresa received the treatments listed below:    therapeutic exercises to develop strength, endurance, ROM, and flexibility for 10 minutes including:  Heel Slides, seated and supine with strap 10x5" hold each  Gastroc Stretch with Strap, seated, 10x10" hold  Glute Sets    manual therapy techniques: Joint mobilizations were applied for 5 minutes, including:  Patellar Mobilizations, all planes    neuromuscular re-education activities to improve: Balance, Kinesthetic, Sense, Proprioception, and Posture for 10 minutes. The following activities were included:  Quadriceps Sets, 20x5" hold, tactile cueing for facilitation  SAQ, 10x3" hold    therapeutic activities to improve functional performance for 10  minutes, including:  Sit to Stand Transfer, one hand on walker, one hand on stable support  Stair Navigation, 4-6" steps, step-to, non-reciprocal pattern, 1x trial without AD, bilateral hand-hold assistance    gait training to improve functional mobility and safety for 5  minutes, including:  Ambulating with Rolling Walk    cold pack for 10 minutes to left knee.    PATIENT EDUCATION AND HOME EXERCISES     Education provided:   - -Findings of evaluation and examination, and affect of these on plan for treatment  -Prognosis and expectations  -Role of PT and team-centered care for patient  -Home exercise program and expectations of therapy  -[facility] of [facility] and PT/PTA treatment    Written Home Exercises Provided: yes. Exercises were reviewed and Teresa was able to demonstrate them prior to the end of the session.  Teresa demonstrated good  understanding of the education provided. See EMR under Patient Instructions for exercises provided during therapy sessions.    ASSESSMENT     Teresa " is a 47 y.o. female referred to outpatient Physical Therapy with a medical diagnosis of  M17.12 (ICD-10-CM) - Primary osteoarthritis of left knee. Patient presents with limitations that are consistent with post-operative status of left total knee. She demonstrates -5 degrees of extension range of motion that was able to improve after manual therapy and treatment. She demonstrates 70 degrees of knee flexion. Patient exhibits gait difficulties, decreased active and passive range of motion, edema, pain and poor quadriceps contraction.    Patient prognosis is Excellent.   Patient will benefit from skilled outpatient Physical Therapy to address the deficits stated above and in the chart below, provide patient /family education, and to maximize patientt's level of independence.     Plan of care discussed with patient: Yes  Patient's spiritual, cultural and educational needs considered and patient is agreeable to the plan of care and goals as stated below:     Anticipated Barriers for therapy: None    Medical Necessity is demonstrated by the following  History  Co-morbidities and personal factors that may impact the plan of care Co-morbidities:   None    Personal Factors:   no deficits     low   Examination  Body Structures and Functions, activity limitations and participation restrictions that may impact the plan of care Body Regions:   lower extremities    Body Systems:    gross symmetry  ROM  strength  gross coordinated movement  balance  gait  transfers  transitions  motor control  motor learning  blood pressure  edema  scar formation  skin integrity    Participation Restrictions:     Activity limitations:   Learning and applying knowledge  no deficits    General Tasks and Commands  no deficits    Communication  no deficits    Mobility  lifting and carrying objects  walking  driving (bike, car, motorcycle)    Self care  washing oneself (bathing, drying, washing hands)  caring for body parts (brushing teeth, shaving,  grooming)  toileting  dressing    Domestic Life  shopping  cooking  doing house work (cleaning house, washing dishes, laundry)  assisting others    Interactions/Relationships  no deficits    Life Areas  employment    Community and Social Life  community life  recreation and leisure         low   Clinical Presentation stable and uncomplicated low   Decision Making/ Complexity Score: low     Goals:  Short Term Goals:   Short Term Goals: 3 days  1.) Patient will demonstrate independence in compliance and technique of home exercise program provided as per teach-back method of assessment.  2.) Patient will demonstrate no greater than -5 degree loss of knee extension upon conclusion of treatment.  3.) Patient will demonstrate 70 degrees of knee flexion upon conclusion of treatment.    PLAN   Plan of care Certification: 9/2/2022 to Short Term Goals: 3 days  Outpatient Physical Therapy 3 times weekly for 1 weeks to include the following interventions: Gait Training, Manual Therapy, Moist Heat/ Ice, Neuromuscular Re-ed, Patient Education, Self Care, Therapeutic Activities, and Therapeutic Exercise.     Emma Dumont PT, DPT, OCS  License Number: [id number]        I CERTIFY THE NEED FOR THESE SERVICES FURNISHED UNDER THIS PLAN OF TREATMENT AND WHILE UNDER MY CARE   Physician's comments:     Physician's Signature: ___________________________________________________

## 2022-09-03 NOTE — TELEPHONE ENCOUNTER
Met with patient and her  at [facility].  Patient reports pain is tolerable, minimal swelling and bruising, dressing CDI,,LEXA hose on, knows to ice/elevate no questions about meds or post op care, PT went well today.  Pt reports she took a shower shortly after getting up this morning and got dizzy - getting up quickly, hot water, medications all contributed.  Sat down after and was fine.  Pt also reports having some hearing change - concerned it is related to getting vancomycin preop.  I advised this is rare, would not happen with a single dose, and that dose was given via rate based infusion pump/not bolus -discussed with NP who concurs. Suggested possibly due to an allergen  as patient is not local to this area and has been sitting outside by the pool while here.  Patient states she has no other sinus/allergy symptoms and it is only one ear not both.  We follow up on this next week.  No further questions or concerns - has  and hot line numbers to call over weekend for any needs.

## 2022-09-06 ENCOUNTER — CLINICAL SUPPORT (OUTPATIENT)
Dept: REHABILITATION | Facility: HOSPITAL | Age: 47
End: 2022-09-06
Payer: COMMERCIAL

## 2022-09-06 ENCOUNTER — OFFICE VISIT (OUTPATIENT)
Dept: ORTHOPEDICS | Facility: CLINIC | Age: 47
End: 2022-09-06
Payer: COMMERCIAL

## 2022-09-06 VITALS — BODY MASS INDEX: 27.16 KG/M2 | HEIGHT: 66 IN | WEIGHT: 169 LBS

## 2022-09-06 DIAGNOSIS — M25.662 DECREASED RANGE OF MOTION OF LEFT KNEE: ICD-10-CM

## 2022-09-06 DIAGNOSIS — Z96.659 STATUS POST KNEE REPLACEMENT, UNSPECIFIED LATERALITY: Primary | ICD-10-CM

## 2022-09-06 PROCEDURE — 97140 MANUAL THERAPY 1/> REGIONS: CPT | Mod: PO

## 2022-09-06 PROCEDURE — 99999 PR PBB SHADOW E&M-EST. PATIENT-LVL III: ICD-10-PCS | Mod: PBBFAC,COE,, | Performed by: NURSE PRACTITIONER

## 2022-09-06 PROCEDURE — 99024 PR POST-OP FOLLOW-UP VISIT: ICD-10-PCS | Mod: S$GLB,COE,, | Performed by: NURSE PRACTITIONER

## 2022-09-06 PROCEDURE — 99024 POSTOP FOLLOW-UP VISIT: CPT | Mod: S$GLB,COE,, | Performed by: NURSE PRACTITIONER

## 2022-09-06 PROCEDURE — 97110 THERAPEUTIC EXERCISES: CPT | Mod: PO

## 2022-09-06 PROCEDURE — 99999 PR PBB SHADOW E&M-EST. PATIENT-LVL III: CPT | Mod: PBBFAC,COE,, | Performed by: NURSE PRACTITIONER

## 2022-09-06 PROCEDURE — 97112 NEUROMUSCULAR REEDUCATION: CPT | Mod: PO

## 2022-09-06 RX ORDER — OXYCODONE HYDROCHLORIDE 5 MG/1
TABLET ORAL
Qty: 30 TABLET | Refills: 0 | Status: SHIPPED | OUTPATIENT
Start: 2022-09-06

## 2022-09-06 NOTE — PROGRESS NOTES
"Teresa Azar presents for initial post-operative visit following a left total knee arthroplasty performed by Dr. Martin on 8/31/2022.  Pt returns 1 week post op for a clear to travel visit. She is a [facility] patient.    Exam:   Height 5' 5.5" (1.664 m), weight 76.7 kg (169 lb).   Ambulating well with assistive device.  Incision is clean and dry without drainage or erythema.   ROM:0-100    Initial post-operative radiographs reviewed today revealing a well fixed and aligned prosthesis.    Dr. Martin came to the room to evaluate the patient as well  The incision was visualized and the dressing was replaced and covered with tegaderm to maintain the waterproof dressing    A/P:  1 week s/p left total knee replacement  - The patient was advised to keep the dressing clean and dry for the next 7 days after which she may wash the area with antibacterial soap in the shower. Will not submerge incision until one month post op.  - Outpatient PT: to continue via video  - Continue aspirin for 1 month post op.  - Pain medication refill sent to pharmacy for patient to  before returning home  - Reviewed antibiotic prophylaxis   - Follow up as scheduled at home. Pt will call clinic with problems/concerns.      "

## 2022-09-06 NOTE — PROGRESS NOTES
"  Physical Therapy Daily Treatment Note/Discharge Note     Name: Teresa Azar  Clinic Number: [id number]    Therapy Diagnosis:   Encounter Diagnosis   Name Primary?    Decreased range of motion of left knee      Physician: Jose Martin III, *    Visit Date: 9/6/2022    Physician Orders: PT Eval and Treat   Medical Diagnosis from Referral: M17.12 (ICD-10-CM) - Primary osteoarthritis of left knee  Evaluation Date: 9/2/2022  Authorization Period Expiration: 8/31/2023  Plan of Care Expiration: 10/2/2022  Progress Note Due: 9/6/2022  Visit # / Visits authorized: 8/30    1st FOTO Follow up:   2nd FOTO Follow up:     Time In: 702am  Time Out: 758am  Total Billable Time: 52 minutes    Precautions: Standard and s/p L TKA on 8/31/2022    Subjective     Pt reports: that she is not experiencing any knee pain at this time and her pain has been well controlled over the weekend. Her exercises have been going well at home. She will be seeing her surgeon later today and will be flying home this afternoon.   She was compliant with home exercise program.  Response to previous treatment: decreased pain  Functional change: independent with HEP    Pain: 3/10  Location: left knee      Objective     Knee Active Range of Motion    Right Left   Flexion Full range 76 degrees   Extension +5 degrees of hyperextension 0 degrees      Knee Passive Range of Motion    Right  Left End-Feel   Flexion Not tested. 80 degrees Restricted, boggy   Extension Not tested. 0 degrees        Joint Mobility  Tibiofemoral A/P Check Not tested.   Tibiofemoral P/A Glide Not tested.   Patellofemoral Joint Mobility Boggy, no marked restriction.      Manual Muscle Testing  Fair quality quadriceps contraction.    Teresa received therapeutic exercises to develop strength, endurance, and ROM for 30 minutes including:    Assessment as above  Heel prop extension stretch, 6 minutes   Heel Slides, seated and supine with strap 3 x 8 with 5" hold each  Gastroc Stretch " "with Strap, seated, 10x10" hold  Seated edge of mat SLRs, 3 x 8  Standing heel raises, 2 x 10  Standing hip extensions, 2 x 10  Standing hip abduction, 2 x 10  Sit to stands from 24" chair, 2 x 10    Teresa received the following manual therapy techniques: Joint mobilizations were applied to the: left knee for 10 minutes, including:    Knee extension over pressure with tibial ER  Posterior tibial glides with IR, grade III  Patellar mobs in all directions, grade III    Teresa participated in neuromuscular re-education activities to improve: Balance, Coordination and proprioception for 10 minutes. The following activities were included:    Quadriceps Sets, 3 x 10 with 5" hold, tactile cueing for facilitation  SAQ, 3 x 8 with 3" hold  LAQs at edge of table with AAROM from PT, 3 x 8      Home Exercises Provided and Patient Education Provided     Education provided:   -Findings of evaluation and examination, and affect of these on plan for treatment  -Prognosis and expectations  -Role of PT and team-centered care for patient  -Home exercise program and expectations of therapy  -[facility] and PT/PTA treatment    Written Home Exercises Provided: Patient instructed to cont prior HEP.  Exercises were reviewed and Teresa was able to demonstrate them prior to the end of the session.  Teresa demonstrated good  understanding of the education provided.     See EMR under Patient Instructions for exercises provided  9/2/2022 .    Assessment     Teresa presents to PT today 6 days s/p a L TKA. Today she ambulates into the clinic today with her RW. She displays a good gait sequence with a step through pattern. She does have a bit of a stiff knee pattern and lacks full extension a initial contact. She had made improvements in her flexion ROM, which was measured at 80 degrees today. Her knee extension was full today. She was unable to perform a SLR today without a quad lag but she displayed fair quad activation. A " this time Teresa is reaching her 1st week milestones following her TKA. She will be returning home later today and will be continuing outpatient physical therapy once she returns. At this time, she will be discharged from skilled PT services.     Teresa Is progressing well towards her goals.   Pt prognosis is Excellent.     Pt will continue to benefit from skilled outpatient physical therapy to address the deficits listed in the problem list box on initial evaluation, provide pt/family education and to maximize pt's level of independence in the home and community environment.     Pt's spiritual, cultural and educational needs considered and pt agreeable to plan of care and goals.     Anticipated barriers to physical therapy: none    Goals:  Short Term Goals: 3 days  1.) Patient will demonstrate independence in compliance and technique of home exercise program provided as per teach-back method of assessment. (MET)  2.) Patient will demonstrate no greater than -5 degree loss of knee extension upon conclusion of treatment. (MET)  3.) Patient will demonstrate 70 degrees of knee flexion upon conclusion of treatment. (MET)    Plan     Discharge from skilled PT services    FILIPE SARAVIA, PT

## 2022-09-07 NOTE — PLAN OF CARE
[place] [facility]  Discharge Assessment    Primary Care Provider: Sarthak Cheung MD     Discharge Assessment (most recent)       BRIEF DISCHARGE ASSESSMENT - 08/31/22 1610          Discharge Planning    Assessment Type Discharge Planning Brief Assessment     Resource/Environmental Concerns none     Support Systems Spouse/significant other     Equipment Currently Used at Home none     Current Living Arrangements home/apartment/condo     Patient/Family Anticipates Transition to home with family                   Per therapy evaluation:  Patient lives in a 2SH with no steps to enter. She is able to stay on the 1st floor. Prior to admission, patients level of function was independent for functional mobility.  Equipment used at home: none. Upon discharge, patient will have assistance from .

## 2022-09-07 NOTE — PLAN OF CARE
[place] [facility])  Discharge Final Note    Primary Care Provider: Sarthak Cheung MD    Expected Discharge Date: 9/1/2022    Final Discharge Note (most recent)       Final Note - 09/01/22 1355          Final Note    Assessment Type Final Discharge Note     Anticipated Discharge Disposition Home or Self Care     What phone number can be called within the next 1-3 days to see how you are doing after discharge? [phone]     Hospital Resources/Appts/Education Provided Provided patient/caregiver with written discharge plan information;Appointments scheduled by Navigator/Coordinator                            Contact Info       Jose Martin III, MD   Specialty: Orthopedic Surgery    [address] [place] [id number]   Phone: [phone]       Next Steps: Follow up in 2 week(s)    Instructions: For wound re-check

## 2022-09-11 ENCOUNTER — PATIENT MESSAGE (OUTPATIENT)
Dept: ORTHOPEDICS | Facility: CLINIC | Age: 47
End: 2022-09-11
Payer: COMMERCIAL

## 2022-11-15 ENCOUNTER — PATIENT MESSAGE (OUTPATIENT)
Dept: RESEARCH | Facility: HOSPITAL | Age: 47
End: 2022-11-15
Payer: COMMERCIAL

## 2022-11-28 ENCOUNTER — PATIENT MESSAGE (OUTPATIENT)
Dept: RESEARCH | Facility: HOSPITAL | Age: 47
End: 2022-11-28
Payer: COMMERCIAL

## 2023-01-09 ENCOUNTER — PATIENT MESSAGE (OUTPATIENT)
Dept: ORTHOPEDICS | Facility: CLINIC | Age: 48
End: 2023-01-09
Payer: COMMERCIAL

## (undated) DEVICE — SUT 2/0 36IN COATED VICRYL

## (undated) DEVICE — BLADE RECIP DOUBLE SIDED

## (undated) DEVICE — UNDERGLOVES BIOGEL PI SIZE 7.5

## (undated) DEVICE — DRESSING TELFA N ADH 3X8

## (undated) DEVICE — SYS REVOLUTION CEMENT MIXING

## (undated) DEVICE — ELECTRODE REM PLYHSV RETURN 9

## (undated) DEVICE — KIT IRR SUCTION HND PIECE

## (undated) DEVICE — PAD KNEE POLAR XL

## (undated) DEVICE — TAPE SILK 3IN

## (undated) DEVICE — SOL BETADINE 5%

## (undated) DEVICE — DRAPE T EXTRM SURG 121X128X90

## (undated) DEVICE — BLADE DUAL CUT SAG 35X64X.89MM

## (undated) DEVICE — CONTAINER SPECIMEN OR STER 4OZ

## (undated) DEVICE — UNDERGLOVES BIOGEL PI SIZE 8.5

## (undated) DEVICE — SUT QUILL PDO VIOL CP 45CM 2

## (undated) DEVICE — TOWEL OR XRAY WHITE 17X26IN

## (undated) DEVICE — SOL IRR NACL .9% 3000ML

## (undated) DEVICE — DRESSING TRANS 4X4 TEGADERM

## (undated) DEVICE — BLADE SAGITTAL 18 X 1.27 X 90M

## (undated) DEVICE — COVER MAYO STND XL 30X57IN

## (undated) DEVICE — GAUZE SPONGE 4X4 12PLY

## (undated) DEVICE — SYR 50CC LL

## (undated) DEVICE — BLADE RECIP DS OFST 70X1X12.5

## (undated) DEVICE — BRUSH SCRUB HIBICLENS 4%

## (undated) DEVICE — GLOVE BIOGEL SKINSENSE PI 8.0

## (undated) DEVICE — DRAPE TOP 53X102IN

## (undated) DEVICE — TUBE SUCTION YANKAUER

## (undated) DEVICE — SYS KNEE EPAK PIN ATTUNE
Type: IMPLANTABLE DEVICE | Site: KNEE | Status: NON-FUNCTIONAL
Removed: 2022-08-31

## (undated) DEVICE — DRESSING AQUACEL RIBBON 2X45CM

## (undated) DEVICE — NDL 18GA X1 1/2 REG BEVEL

## (undated) DEVICE — PUMP COLD THERAPY

## (undated) DEVICE — KIT TOTAL KNEE TKOFG

## (undated) DEVICE — GLOVE BIOGEL PI MICRO SZ 7

## (undated) DEVICE — GOWN POLY REINF BRTH SLV 3XL

## (undated) DEVICE — BLANKET HYPOTHERMIA 25X64IN

## (undated) DEVICE — MARKER SKIN STND TIP BLUE BARR

## (undated) DEVICE — MASK FLYTE HOOD PEEL AWAY

## (undated) DEVICE — DRAPE INCISE IOBAN 2 23X33IN

## (undated) DEVICE — SPONGE GAUZE 16PLY 4X4

## (undated) DEVICE — ALCOHOL 70% ISOP RUBBING 4OZ

## (undated) DEVICE — ADHESIVE DERMABOND ADVANCED

## (undated) DEVICE — GOWN POLY REINF X-LONG XL

## (undated) DEVICE — SUT 1 36IN COATED VICRYL UN

## (undated) DEVICE — DRESSING AQUACEL AG RBBN 2X45

## (undated) DEVICE — DRESSING TELFA PAD N ADH 2X3

## (undated) DEVICE — DRAPE SURG W/TWL 17 5/8X23

## (undated) DEVICE — SUT MCRYL PLUS 4-0 PS2 27IN

## (undated) DEVICE — CATH SUCTION 10FR